# Patient Record
Sex: MALE | Race: BLACK OR AFRICAN AMERICAN | NOT HISPANIC OR LATINO | Employment: STUDENT | URBAN - METROPOLITAN AREA
[De-identification: names, ages, dates, MRNs, and addresses within clinical notes are randomized per-mention and may not be internally consistent; named-entity substitution may affect disease eponyms.]

---
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
## 2020-01-09 DIAGNOSIS — E10.65 UNCONTROLLED TYPE 1 DIABETES MELLITUS WITH HYPERGLYCEMIA: ICD-10-CM

## 2020-01-09 NOTE — TELEPHONE ENCOUNTER
Returned ivan's call requesting peds endo office submit a PA for 90 day supply of Humalog.  Dad informed PA will be submitted via [employer] and the turnaround time could take 24-48 hours for a response.  Ivan verbalized understanding.    ----- Message from Shannan Mcfadden sent at 1/9/2020  2:25 PM CST -----  Contact: Ivan [phone]  Would like to receive medical advice.    Would they like a call back or a response via [employer]:  Call back     Additional information: ivan is calling to speak with the nurse regarding medication    insulin lispro (HUMALOG U-100 INSULIN) 100 unit/mL injection needs a PA. Ivan is requesting a call back regarding PA.

## 2020-02-11 DIAGNOSIS — E10.65 UNCONTROLLED TYPE 1 DIABETES MELLITUS WITH HYPERGLYCEMIA: ICD-10-CM

## 2020-02-13 RX ORDER — INSULIN LISPRO 100 [IU]/ML
INJECTION, SOLUTION INTRAVENOUS; SUBCUTANEOUS
Qty: 90 ML | Refills: 1 | Status: SHIPPED | OUTPATIENT
Start: 2020-02-13 | End: 2021-01-04 | Stop reason: SDUPTHER

## 2020-03-19 ENCOUNTER — TELEPHONE (OUTPATIENT)
Dept: PEDIATRIC ENDOCRINOLOGY | Facility: CLINIC | Age: 20
End: 2020-03-19

## 2020-03-19 NOTE — TELEPHONE ENCOUNTER
Per Dr. Weaver, called pt to schedule [employer] Virtual Visit for  appt scheduled for Monday (3/23) at .  Pt verified he has an active [employer] account and an iOS or Android cell phone or tablet with a microphone and front-facing camera with wi-fi connection.  Informed to check in 15 min prior to video visit via [employer] to begin the video visit and if any issues to contact our office prior to video visit.  Pt stated  verbalized understanding.

## 2020-03-23 ENCOUNTER — OFFICE VISIT (OUTPATIENT)
Dept: PEDIATRIC ENDOCRINOLOGY | Facility: CLINIC | Age: 20
End: 2020-03-23
Payer: COMMERCIAL

## 2020-03-23 DIAGNOSIS — E10.65 TYPE 1 DIABETES MELLITUS WITH HYPERGLYCEMIA: Primary | ICD-10-CM

## 2020-03-23 PROCEDURE — 3044F HG A1C LEVEL LT 7.0%: CPT | Mod: CPTII,95,, | Performed by: PEDIATRICS

## 2020-03-23 PROCEDURE — 3066F NEPHROPATHY DOC TX: CPT | Mod: CPTII,95,, | Performed by: PEDIATRICS

## 2020-03-23 PROCEDURE — 99499 NO LOS: ICD-10-PCS | Mod: 95,,, | Performed by: PEDIATRICS

## 2020-03-23 PROCEDURE — 3044F PR MOST RECENT HEMOGLOBIN A1C LEVEL <7.0%: ICD-10-PCS | Mod: CPTII,95,, | Performed by: PEDIATRICS

## 2020-03-23 PROCEDURE — 3061F PR NEG MICROALBUMINURIA RESULT DOCUMENTED/REVIEW: ICD-10-PCS | Mod: CPTII,95,, | Performed by: PEDIATRICS

## 2020-03-23 PROCEDURE — 3061F NEG MICROALBUMINURIA REV: CPT | Mod: CPTII,95,, | Performed by: PEDIATRICS

## 2020-03-23 PROCEDURE — 99499 UNLISTED E&M SERVICE: CPT | Mod: 95,,, | Performed by: PEDIATRICS

## 2020-03-23 PROCEDURE — 3066F PR DOCUMENTATION OF TREATMENT FOR NEPHROPATHY: ICD-10-PCS | Mod: CPTII,95,, | Performed by: PEDIATRICS

## 2020-04-06 DIAGNOSIS — E10.65 UNCONTROLLED TYPE 1 DIABETES MELLITUS WITH HYPERGLYCEMIA: ICD-10-CM

## 2020-04-17 DIAGNOSIS — E10.65 UNCONTROLLED TYPE 1 DIABETES MELLITUS WITH HYPERGLYCEMIA: ICD-10-CM

## 2020-04-17 NOTE — TELEPHONE ENCOUNTER
Returned pt's call requesting rx to [facility] for Dexcom transmitter refill.  Pt informed refill request will be forwarded to provider' verbalized understanding.    ----- Message from Joy Aaron sent at 4/17/2020 12:23 PM CDT -----  Contact: The Pt [phone]  Type:  Needs Medical Advice    Who Called: The Pt  Symptoms (please be specific):   How long has patient had these symptoms:   Pharmacy name and phone #: [facility] - [place], [place] - [address] [phone] (Phone)  [phone] (Fax)  Would the patient rather a call back The Pt [phone]  Best Call Back Number  :Additional Information:  The requested a refill for transmitters but got sensors Please call the pt.

## 2020-05-12 ENCOUNTER — PATIENT MESSAGE (OUTPATIENT)
Dept: ADMINISTRATIVE | Facility: HOSPITAL | Age: 20
End: 2020-05-12

## 2020-07-15 ENCOUNTER — LAB VISIT (OUTPATIENT)
Dept: LAB | Facility: HOSPITAL | Age: 20
End: 2020-07-15
Attending: NURSE PRACTITIONER
Payer: COMMERCIAL

## 2020-07-15 ENCOUNTER — OFFICE VISIT (OUTPATIENT)
Dept: PEDIATRIC ENDOCRINOLOGY | Facility: CLINIC | Age: 20
End: 2020-07-15
Payer: COMMERCIAL

## 2020-07-15 VITALS
WEIGHT: 159.06 LBS | SYSTOLIC BLOOD PRESSURE: 118 MMHG | DIASTOLIC BLOOD PRESSURE: 69 MMHG | HEART RATE: 87 BPM | HEIGHT: 66 IN | BODY MASS INDEX: 25.56 KG/M2

## 2020-07-15 DIAGNOSIS — E10.65 UNCONTROLLED TYPE 1 DIABETES MELLITUS WITH HYPERGLYCEMIA: ICD-10-CM

## 2020-07-15 DIAGNOSIS — E10.9 CONTROLLED DIABETES MELLITUS TYPE 1 WITHOUT COMPLICATIONS: ICD-10-CM

## 2020-07-15 DIAGNOSIS — E10.649 HYPOGLYCEMIA DUE TO TYPE 1 DIABETES MELLITUS: ICD-10-CM

## 2020-07-15 DIAGNOSIS — E10.9 DIABETES MELLITUS TYPE 1, UNCOMPLICATED, ON LONG TERM INSULIN PUMP: Primary | ICD-10-CM

## 2020-07-15 DIAGNOSIS — Z96.41 DIABETES MELLITUS TYPE 1, UNCOMPLICATED, ON LONG TERM INSULIN PUMP: Primary | ICD-10-CM

## 2020-07-15 DIAGNOSIS — Z46.81 INSULIN PUMP TITRATION: ICD-10-CM

## 2020-07-15 DIAGNOSIS — Z96.41 INSULIN PUMP STATUS: ICD-10-CM

## 2020-07-15 LAB — TSH SERPL DL<=0.005 MIU/L-ACNC: 1.21 UIU/ML (ref 0.4–4)

## 2020-07-15 PROCEDURE — 84443 ASSAY THYROID STIM HORMONE: CPT

## 2020-07-15 PROCEDURE — 83036 HEMOGLOBIN GLYCOSYLATED A1C: CPT

## 2020-07-15 PROCEDURE — 95251 PR GLUCOSE MONITOR, 72 HOUR, PHYS INTERP: ICD-10-PCS | Mod: S$GLB,,, | Performed by: NURSE PRACTITIONER

## 2020-07-15 PROCEDURE — 99215 OFFICE O/P EST HI 40 MIN: CPT | Mod: S$GLB,,, | Performed by: NURSE PRACTITIONER

## 2020-07-15 PROCEDURE — 99215 PR OFFICE/OUTPT VISIT, EST, LEVL V, 40-54 MIN: ICD-10-PCS | Mod: S$GLB,,, | Performed by: NURSE PRACTITIONER

## 2020-07-15 PROCEDURE — 99999 PR PBB SHADOW E&M-EST. PATIENT-LVL IV: ICD-10-PCS | Mod: PBBFAC,,, | Performed by: NURSE PRACTITIONER

## 2020-07-15 PROCEDURE — 99999 PR PBB SHADOW E&M-EST. PATIENT-LVL IV: CPT | Mod: PBBFAC,,, | Performed by: NURSE PRACTITIONER

## 2020-07-15 PROCEDURE — 36415 COLL VENOUS BLD VENIPUNCTURE: CPT

## 2020-07-15 PROCEDURE — 95251 CONT GLUC MNTR ANALYSIS I&R: CPT | Mod: S$GLB,,, | Performed by: NURSE PRACTITIONER

## 2020-07-15 NOTE — PROGRESS NOTES
Cindy Garcia is a 19 y.o. male being seen in the pediatric endocrinology clinic today in follow up for type 1 diabetes. He was accompanied by his father.    He was diagnosed with type 1 diabetes in April 2006. He was last seen on 3/23/2020 by Dr. Weaver. This last visit was done virutally due to COVID-19.     Interval History:   He is on CSII using Tandem TSlim (transitioned from Omnipod in June 2019). He is also using a Dexcom CGM G6 and has new Control IQ program. No severe hypoglycemic events, DKA or other adverse events since last visit.     Review of blood sugars from pump download/logbook, shows: overall average blood glucose of 196 mg/dL (Range 184-208). Only 8% correction bolus.  Injection/infusion sites for his pump include: back, thighs. Infusion site for Dexcom is arms.CGM data: His average BG on his CGM is 142 mg/dL +/- 47. GMI 6.7%. He is in range 75% of the time, above range 20% of the time, below range 4 %, <1% very low. CGM wear 29/30 days.     Cindy FLORES is having few episodes of hypoglycemia. He feels shaky and hungry when his BG is low.  He denies symptoms of hyperglycemia such as blurry vision, excessive thirst, fatigue and polyuria.      Nutrition: carb counting but is not on a specified limit, giving insulin with start of meals    Review of growth chart shows: 7 lb weight gain    Insulin Instructions  Pump Settings   insulin lispro 100 unit/mL injection   Last edited by Allison Hubbard NP on 7/16/2020 at 3:41 PM      Basal Rate   Total Basal Dose: 24.6 units/day   Time units/hr   12:00 AM 0.95    3:00 AM 0.95    6:00 AM 1    9:00 PM 1.3      Blood Glucose Target   Time mg/dL   12:00  - 120      Sensitivity Factor   Time mg/dL/unit   12:00 AM 40      Carb Ratio   Time g/unit   12:00 AM 9    3:00 AM 7    9:00 PM 8     Total daily dose: 52 units/day, 43% basal    Review of Systems:  Constitutional: Negative for fever.   HENT: Negative for congestion and sore throat.    Eyes: Negative for  "discharge and redness.   Respiratory: Negative for cough and shortness of breath.    Cardiovascular: Negative for chest pain.   Gastrointestinal: Negative for nausea and vomiting, constipation, or diarrhea.    Musculoskeletal: Negative for myalgias or arthralgias.   Skin: Negative for rash.   Neurological: Negative for headaches.   Endocrine: see HPI and negative for - change in hair pattern or skin changes    Past Medical/Family/Surgical History:  I have reviewed, and verified the past medical, surgical, and family history and updated as appropriate. No changes.    Social History:  He will be starting sophomore year at [employer], studying animal science, wants to be a .    Meds:  Reviewed and reconciled.     Physical Exam:  /69   Pulse 87   Ht 5' 5.98" (1.676 m)   Wt 72.1 kg (159 lb 1 oz)   BMI 25.69 kg/m²    General: alert, active, in no acute distress  Skin: normal tone and texture, no rashes  Injection Sites: normal  Eyes:  Conjunctivae are normal  Neck:  supple, no lymphadenopathy, no thyromegaly  Lungs: Effort normal and breath sounds clear.   Heart:  regular rate and rhythm, no murmur, no edema  Abdomen:  Abdomen soft, non-tender. No hepatomegaly.  Neuro: gross motor exam normal by observation, denies tingling or burning in feet or hands  Foot exam: feet warm, no erythema, no skin breakdown or lesions, 2+ pedal pulses bilaterally    Labs:  Hemoglobin A1C   Date Value Ref Range Status   12/23/2019 6.4 (H) 4.0 - 5.6 % Final     Comment:     ADA Screening Guidelines:  5.7-6.4%  Consistent with prediabetes  >or=6.5%  Consistent with diabetes  High levels of fetal hemoglobin interfere with the HbA1C  assay. Heterozygous hemoglobin variants (HbS, HgC, etc)do  not significantly interfere with this assay.   However, presence of multiple variants may affect accuracy.     06/04/2019 7.2 (H) 4.0 - 5.6 % Final     Comment:     ADA Screening Guidelines:  5.7-6.4%  Consistent with prediabetes  >or=6.5%  " Consistent with diabetes  High levels of fetal hemoglobin interfere with the HbA1C  assay. Heterozygous hemoglobin variants (HbS, HgC, etc)do  not significantly interfere with this assay.   However, presence of multiple variants may affect accuracy.     03/04/2019 7.6 (H) 4.0 - 5.6 % Final     Comment:     ADA Screening Guidelines:  5.7-6.4%  Consistent with prediabetes  >or=6.5%  Consistent with diabetes  High levels of fetal hemoglobin interfere with the HbA1C  assay. Heterozygous hemoglobin variants (HbS, HgC, etc)do  not significantly interfere with this assay.   However, presence of multiple variants may affect accuracy.         Screening tests:   Component      Latest Ref Rng & Units 3/4/2019 3/23/2018   Cholesterol      120 - 199 mg/dL  158   Triglycerides      30 - 150 mg/dL  43   HDL      40 - 75 mg/dL  59   LDL Cholesterol External      63.0 - 159.0 mg/dL  90.4   Hdl/Cholesterol Ratio      20.0 - 50.0 %  37.3   Total Cholesterol/HDL Ratio      2.0 - 5.0  2.7   Non-HDL Cholesterol      mg/dL  99   Microalbum.,U,Random      ug/mL 7.0    Creatinine, Random Ur      23.0 - 375.0 mg/dL 259.0    MICROALB/CREAT RATIO      0.0 - 30.0 ug/mg 2.7    TSH      0.400 - 4.000 uIU/mL 1.181    IgA      40 - 350 mg/dL 126    TTG IgA      <20 UNITS 4        Eye Exam: 7/14/2020 - Dr. Iftikhar Carreon - no diabetic retinopathy    Assessment/Plan:  Cindy FLORES is a 19 y.o. male with T1D of ~14 years 3 months duration on ~0.72 units/kg/day.      Lab Results   Component Value Date    HGBA1C 6.3 (H) 07/15/2020     Generally doing well with diabetes under good control. A1c is within target range for age.    His blood sugars, pump settings, and bolus doses were reviewed for the past four weeks. His CGM data was reviewed for the past month. The following changes were made to his insulin regimen: increased basal insulin rate during the day, adjusted sleep schedule times on pump to coincide with his sleep patterns during the  summer.    Education: blood sugar goals, complications of diabetes mellitus, hypoglycemia prevention and treatment, exercise, site rotation and insulin adjustments, and causes and consequences of prolonged elevations in blood glucose and A1C, sick day management, causes, recognition and consequences of DKA, school issues, and goals for therapy.    Screening tests due:  Lipid panel screening - recommended in 3 years if normal, LDL goal <100: Due 3/23/2021  Thyroid screening annually - due now, overdue  Celiac screen - baseline and 2 yrs after diagnosis: Due if symptoms.  Eye Exam: Biennially 5 years after diagnosis: Due 7/2022  Comprehensive Foot Exam: Annually after 5 years DM: Due 7/2021, unable to do complete neuro assessment due to lack of monofilament and tuning fork  Microablumin/creatinine ratio: Annually 5 yrs after diagnosis, Due now, overdue    Component      Latest Ref Rng & Units 7/15/2020   Microalbum.,U,Random      ug/mL 4.0   Creatinine, Random Ur      23.0 - 375.0 mg/dL 229.0   MICROALB/CREAT RATIO      0.0 - 30.0 ug/mg 1.7   TSH      0.400 - 4.000 uIU/mL 1.206     It was a pleasure to see your patient in clinic today. Please call with any questions or concerns.      HAZEL Gtz  Pediatric Endocrinology    Over 50% of this 50 minute visit was spent in counseling/coordinating care. I counseled the family on the education topics listed above.

## 2020-07-15 NOTE — PATIENT INSTRUCTIONS
Insulin Instructions  Pump Settings   insulin lispro 100 unit/mL injection   Last edited by Allison Hubbard NP on 7/15/2020 at 8:58 AM      Basal Rate   Total Basal Dose: 26.1 units/day   Time units/hr   12:00 AM 0.95    3:00 AM 0.95    6:00 AM 1.1    9:00 PM 1.3      Blood Glucose Target   Time mg/dL   12:00  - 120      Sensitivity Factor   Time mg/dL/unit   12:00 AM 40      Carb Ratio   Time g/unit   12:00 AM 9    3:00 AM 7    9:00 PM 8

## 2020-07-16 LAB
ESTIMATED AVG GLUCOSE: 134 MG/DL (ref 68–131)
HBA1C MFR BLD HPLC: 6.3 % (ref 4–5.6)

## 2020-07-24 DIAGNOSIS — E10.65 UNCONTROLLED TYPE 1 DIABETES MELLITUS WITH HYPERGLYCEMIA: ICD-10-CM

## 2020-07-24 RX ORDER — BLOOD-GLUCOSE TRANSMITTER
EACH MISCELLANEOUS
Qty: 2 DEVICE | Refills: 4 | Status: SHIPPED | OUTPATIENT
Start: 2020-07-24 | End: 2020-09-27 | Stop reason: SDUPTHER

## 2020-08-14 DIAGNOSIS — Z11.59 NEED FOR HEPATITIS C SCREENING TEST: ICD-10-CM

## 2020-10-05 ENCOUNTER — PATIENT MESSAGE (OUTPATIENT)
Dept: ADMINISTRATIVE | Facility: HOSPITAL | Age: 20
End: 2020-10-05

## 2020-10-15 ENCOUNTER — OFFICE VISIT (OUTPATIENT)
Dept: PEDIATRIC ENDOCRINOLOGY | Facility: CLINIC | Age: 20
End: 2020-10-15
Payer: COMMERCIAL

## 2020-10-15 DIAGNOSIS — Z96.41 DIABETES MELLITUS TYPE 1, UNCOMPLICATED, ON LONG TERM INSULIN PUMP: Primary | ICD-10-CM

## 2020-10-15 DIAGNOSIS — Z96.41 INSULIN PUMP STATUS: ICD-10-CM

## 2020-10-15 DIAGNOSIS — E10.9 DIABETES MELLITUS TYPE 1, UNCOMPLICATED, ON LONG TERM INSULIN PUMP: Primary | ICD-10-CM

## 2020-10-15 DIAGNOSIS — E10.649 HYPOGLYCEMIA DUE TO TYPE 1 DIABETES MELLITUS: ICD-10-CM

## 2020-10-15 PROCEDURE — 99214 OFFICE O/P EST MOD 30 MIN: CPT | Mod: 95,,, | Performed by: NURSE PRACTITIONER

## 2020-10-15 PROCEDURE — 99214 PR OFFICE/OUTPT VISIT, EST, LEVL IV, 30-39 MIN: ICD-10-PCS | Mod: 95,,, | Performed by: NURSE PRACTITIONER

## 2020-10-15 NOTE — PROGRESS NOTES
The patient location is: [facility]  The chief complaint leading to consultation is: type 1 diabetes mellitus follow up    Visit type: audiovisual    Face to Face time with patient: 21 minutes  45 minutes of total time spent on the encounter, which includes face to face time and non-face to face time preparing to see the patient (eg, review of tests), Obtaining and/or reviewing separately obtained history, Documenting clinical information in the electronic or other health record, Independently interpreting results (not separately reported) and communicating results to the patient/family/caregiver, or Care coordination (not separately reported).     Each patient to whom he or she provides medical services by telemedicine is:  (1) informed of the relationship between the physician and patient and the respective role of any other health care provider with respect to management of the patient; and (2) notified that he or she may decline to receive medical services by telemedicine and may withdraw from such care at any time.    Notes:   Cindy Garcia is a 19 y.o. male being seen in the pediatric endocrinology clinic today in follow up for type 1 diabetes.     He was diagnosed with type 1 diabetes in April 2006. He was last seen in July 2020.      Interval History:   He is on CSII using Tandem TSlim (transitioned from Omnipod in June 2019). He is also using a Dexcom CGM G6 and Control IQ program. No DKA or other adverse events since last visit. He had 1 hypoglycemic event which occurred during the night.    Review of blood sugars from CGM download shows: overall average blood glucose of 149 mg/dL +/- 53. He is in target range 72% of the time, above target 25% of the time. 5% correction bolus.GMI 6.9%. He is in range 75% of the time, above range 20% of the time, below range 4 %, <1% very low. CGM wear 29/30 days.   Injection/infusion sites for his pump include: back, thighs.       Cindy FLORES is having rare episodes of hypoglycemia.  He feels shaky and hungry when his BG is low. BG in the 50s this past Tuesday night. He had to get up and treat his low. Unsure of cause of low but was following an extended bolus for his dinner.  He denies symptoms of hyperglycemia such as blurry vision, excessive thirst, fatigue and polyuria.      Nutrition: carb counting but is not on a specified limit, giving insulin with start of meals, occasionally after he starts eating.  Cindy reports eating a lot of frozen food and generally unhealthy. Not eating in the cafeteria at school much.  Exercise: not getting any regular physical activity    Insulin Instructions  Pump Settings   insulin lispro 100 unit/mL injection   Last edited by Allison Hubbard NP on 7/16/2020 at 3:41 PM      Basal Rate   Total Basal Dose: 24.6 units/day   Time units/hr   12:00 AM 0.95    3:00 AM 0.95    6:00 AM 1    9:00 PM 1.3      Blood Glucose Target   Time mg/dL   12:00  - 120      Sensitivity Factor   Time mg/dL/unit   12:00 AM 40      Carb Ratio   Time g/unit   12:00 AM 9    3:00 AM 7    9:00 PM 8     Total daily dose: 63 units/day, 40% basal    Review of Systems:  Constitutional: Negative for fever.   HENT: Negative for congestion and sore throat.    Eyes: Negative for discharge and redness. No acute changes.  Respiratory: Negative for cough and shortness of breath.    Cardiovascular: Negative for chest pain.   Gastrointestinal: Negative for nausea and vomiting, constipation, or diarrhea.    Musculoskeletal: Negative for myalgias or arthralgias.   Skin: Negative for rash.   Neurological: Negative for headaches.   Endocrine: see HPI and negative for - change in hair pattern or skin changes    Past Medical/Family/Surgical History:  I have reviewed, and verified the past medical, surgical, and family history and updated as appropriate. No changes.    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  Classes are hybrid, some in  person and some virtually.    Meds:  Reviewed and reconciled.     Physical Exam:  There were no vitals taken for this visit.   General: alert, active, in no acute distress, well appearing on video  Injection Sites: normal per patient assessment    Labs:  Hemoglobin A1C   Date Value Ref Range Status   07/15/2020 6.3 (H) 4.0 - 5.6 % Final     Comment:     ADA Screening Guidelines:  5.7-6.4%  Consistent with prediabetes  >or=6.5%  Consistent with diabetes  High levels of fetal hemoglobin interfere with the HbA1C  assay. Heterozygous hemoglobin variants (HbS, HgC, etc)do  not significantly interfere with this assay.   However, presence of multiple variants may affect accuracy.     12/23/2019 6.4 (H) 4.0 - 5.6 % Final     Comment:     ADA Screening Guidelines:  5.7-6.4%  Consistent with prediabetes  >or=6.5%  Consistent with diabetes  High levels of fetal hemoglobin interfere with the HbA1C  assay. Heterozygous hemoglobin variants (HbS, HgC, etc)do  not significantly interfere with this assay.   However, presence of multiple variants may affect accuracy.     06/04/2019 7.2 (H) 4.0 - 5.6 % Final     Comment:     ADA Screening Guidelines:  5.7-6.4%  Consistent with prediabetes  >or=6.5%  Consistent with diabetes  High levels of fetal hemoglobin interfere with the HbA1C  assay. Heterozygous hemoglobin variants (HbS, HgC, etc)do  not significantly interfere with this assay.   However, presence of multiple variants may affect accuracy.         Screening tests:   Component      Latest Ref Rng & Units 7/15/2020 3/4/2019 3/23/2018   Cholesterol      120 - 199 mg/dL   158   Triglycerides      30 - 150 mg/dL   43   HDL      40 - 75 mg/dL   59   LDL Cholesterol External      63.0 - 159.0 mg/dL   90.4   Hdl/Cholesterol Ratio      20.0 - 50.0 %   37.3   Total Cholesterol/HDL Ratio      2.0 - 5.0   2.7   Non-HDL Cholesterol      mg/dL   99   Microalbum.,U,Random      ug/mL 4.0     Creatinine, Random Ur      23.0 - 375.0 mg/dL 229.0      MICROALB/CREAT RATIO      0.0 - 30.0 ug/mg 1.7     IgA      40 - 350 mg/dL  126    TTG IgA      <20 UNITS  4    TSH      0.400 - 4.000 uIU/mL 1.206         Eye Exam: 7/14/2020 - Dr. Iftikhar Carreon - no diabetic retinopathy    Assessment/Plan:  Cindy FLORES is a 19 y.o. male with T1D of ~14 years 6 months duration on ~0.87 units/kg/day.      Lab Results   Component Value Date    HGBA1C 6.3 (H) 07/15/2020       His last A1C in July was 6.3% which is consistent with well controlled diabetes. Based upon his pump and CGM data his predicted A1C is ~6.9%. His blood sugars, pump settings, and bolus doses were reviewed for the past four weeks. His CGM data was reviewed for the past month. The following changes were made to his insulin regimen: no changes to his insulin settings at this visit. Cindy is doing well on the Control IQ program. There is very little correction insulin doses being given and rare hypoglycemia. The recent episode may be due to using the extended bolus feature or miscalculating carbs. He will try to be consistent with timing of meal bolus as well.    Education: interpretation of lab results, hypoglycemia prevention and treatment, exercise and nutrition, causes, recognition and consequences of DKA, impact of physical activity on blood glucose control, insulin kinetics, and goals for therapy.    Screening tests due:  Lipid panel screening - recommended in 3 years if normal, LDL goal <100: Due 3/23/2021  Thyroid screening annually - due 7/15/2021  Celiac screen - baseline and 2 yrs after diagnosis: Due if symptoms.  Eye Exam: Biennially 5 years after diagnosis: Due 7/2022  Comprehensive Foot Exam: Annually after 5 years DM: Due 7/2021  Microablumin/creatinine ratio: Annually 5 yrs after diagnosis, Due 7/15/2021    It was a pleasure to see your patient in clinic today. Please call with any questions or concerns.      HAZEL Gtz  Pediatric Endocrinology    Over 50% of this 21 minute visit was spent  in counseling/coordinating care. I counseled the family on the education topics listed above.

## 2020-10-15 NOTE — PATIENT INSTRUCTIONS
Insulin Instructions  Pump Settings   insulin lispro 100 unit/mL injection   Last edited by Allison Hubbard NP on 7/16/2020 at 3:41 PM      Basal Rate   Total Basal Dose: 24.6 units/day   Time units/hr   12:00 AM 0.95    3:00 AM 0.95    6:00 AM 1    9:00 PM 1.3      Blood Glucose Target   Time mg/dL   12:00  - 120      Sensitivity Factor   Time mg/dL/unit   12:00 AM 40      Carb Ratio   Time g/unit   12:00 AM 9    3:00 AM 7    9:00 PM 8

## 2020-12-30 ENCOUNTER — PATIENT MESSAGE (OUTPATIENT)
Dept: PEDIATRIC ENDOCRINOLOGY | Facility: CLINIC | Age: 20
End: 2020-12-30

## 2021-01-04 ENCOUNTER — OFFICE VISIT (OUTPATIENT)
Dept: PEDIATRIC ENDOCRINOLOGY | Facility: CLINIC | Age: 21
End: 2021-01-04
Payer: COMMERCIAL

## 2021-01-04 DIAGNOSIS — Z46.81 INSULIN PUMP TITRATION: ICD-10-CM

## 2021-01-04 DIAGNOSIS — Z96.41 INSULIN PUMP STATUS: ICD-10-CM

## 2021-01-04 DIAGNOSIS — E10.65 TYPE 1 DIABETES MELLITUS WITH HYPERGLYCEMIA: Primary | ICD-10-CM

## 2021-01-04 PROCEDURE — 99214 PR OFFICE/OUTPT VISIT, EST, LEVL IV, 30-39 MIN: ICD-10-PCS | Mod: 95,,, | Performed by: PEDIATRICS

## 2021-01-04 PROCEDURE — 99214 OFFICE O/P EST MOD 30 MIN: CPT | Mod: 95,,, | Performed by: PEDIATRICS

## 2021-01-04 PROCEDURE — 3044F PR MOST RECENT HEMOGLOBIN A1C LEVEL <7.0%: ICD-10-PCS | Mod: CPTII,,, | Performed by: PEDIATRICS

## 2021-01-04 PROCEDURE — 3044F HG A1C LEVEL LT 7.0%: CPT | Mod: CPTII,,, | Performed by: PEDIATRICS

## 2021-01-04 RX ORDER — INSULIN LISPRO 100 [IU]/ML
INJECTION, SOLUTION INTRAVENOUS; SUBCUTANEOUS
Qty: 90 ML | Refills: 1 | Status: SHIPPED | OUTPATIENT
Start: 2021-01-04 | End: 2021-03-26 | Stop reason: SDUPTHER

## 2021-01-04 RX ORDER — BLOOD-GLUCOSE SENSOR
EACH MISCELLANEOUS
Qty: 9 EACH | Refills: 12 | Status: SHIPPED | OUTPATIENT
Start: 2021-01-04 | End: 2021-04-20 | Stop reason: SDUPTHER

## 2021-01-05 ENCOUNTER — LAB VISIT (OUTPATIENT)
Dept: LAB | Facility: HOSPITAL | Age: 21
End: 2021-01-05
Attending: PEDIATRICS
Payer: COMMERCIAL

## 2021-01-05 ENCOUNTER — PATIENT MESSAGE (OUTPATIENT)
Dept: ADMINISTRATIVE | Facility: HOSPITAL | Age: 21
End: 2021-01-05

## 2021-01-05 DIAGNOSIS — E87.0 TYPE I DIABETES MELLITUS WITH HYPEROSMOLAR COMA: Primary | ICD-10-CM

## 2021-01-05 DIAGNOSIS — E10.69 TYPE I DIABETES MELLITUS WITH HYPEROSMOLAR COMA: Primary | ICD-10-CM

## 2021-01-05 DIAGNOSIS — E10.65 TYPE I DIABETES MELLITUS WITH HYPEROSMOLAR COMA: Primary | ICD-10-CM

## 2021-01-05 LAB
ESTIMATED AVG GLUCOSE: 143 MG/DL (ref 68–131)
HBA1C MFR BLD HPLC: 6.6 % (ref 4–5.6)

## 2021-01-05 PROCEDURE — 36415 COLL VENOUS BLD VENIPUNCTURE: CPT

## 2021-01-05 PROCEDURE — 83036 HEMOGLOBIN GLYCOSYLATED A1C: CPT

## 2021-01-08 ENCOUNTER — TELEPHONE (OUTPATIENT)
Dept: PEDIATRIC ENDOCRINOLOGY | Facility: CLINIC | Age: 21
End: 2021-01-08

## 2021-01-13 ENCOUNTER — PATIENT MESSAGE (OUTPATIENT)
Dept: PEDIATRIC ENDOCRINOLOGY | Facility: CLINIC | Age: 21
End: 2021-01-13

## 2021-01-20 ENCOUNTER — TELEPHONE (OUTPATIENT)
Dept: PEDIATRIC ENDOCRINOLOGY | Facility: CLINIC | Age: 21
End: 2021-01-20

## 2021-03-13 ENCOUNTER — IMMUNIZATION (OUTPATIENT)
Dept: INTERNAL MEDICINE | Facility: CLINIC | Age: 21
End: 2021-03-13
Payer: COMMERCIAL

## 2021-03-13 DIAGNOSIS — Z23 NEED FOR VACCINATION: Primary | ICD-10-CM

## 2021-03-13 PROCEDURE — 91300 COVID-19, MRNA, LNP-S, PF, 30 MCG/0.3 ML DOSE VACCINE: CPT | Mod: PBBFAC | Performed by: FAMILY MEDICINE

## 2021-03-23 DIAGNOSIS — E10.65 TYPE 1 DIABETES MELLITUS WITH HYPERGLYCEMIA: ICD-10-CM

## 2021-03-23 RX ORDER — INSULIN LISPRO 100 [IU]/ML
INJECTION, SOLUTION INTRAVENOUS; SUBCUTANEOUS
Qty: 90 ML | Refills: 1 | Status: CANCELLED | OUTPATIENT
Start: 2021-03-23

## 2021-03-26 ENCOUNTER — TELEPHONE (OUTPATIENT)
Dept: ENDOCRINOLOGY | Facility: CLINIC | Age: 21
End: 2021-03-26

## 2021-03-26 DIAGNOSIS — E10.65 TYPE 1 DIABETES MELLITUS WITH HYPERGLYCEMIA: ICD-10-CM

## 2021-03-26 RX ORDER — INSULIN LISPRO 100 [IU]/ML
INJECTION, SOLUTION INTRAVENOUS; SUBCUTANEOUS
Qty: 90 ML | Refills: 1 | Status: SHIPPED | OUTPATIENT
Start: 2021-03-26 | End: 2021-09-20 | Stop reason: SDUPTHER

## 2021-04-03 ENCOUNTER — IMMUNIZATION (OUTPATIENT)
Dept: INTERNAL MEDICINE | Facility: CLINIC | Age: 21
End: 2021-04-03
Payer: COMMERCIAL

## 2021-04-03 DIAGNOSIS — Z23 NEED FOR VACCINATION: Primary | ICD-10-CM

## 2021-04-03 PROCEDURE — 0002A COVID-19, MRNA, LNP-S, PF, 30 MCG/0.3 ML DOSE VACCINE: CPT | Mod: PBBFAC | Performed by: FAMILY MEDICINE

## 2021-04-03 PROCEDURE — 91300 COVID-19, MRNA, LNP-S, PF, 30 MCG/0.3 ML DOSE VACCINE: CPT | Mod: PBBFAC | Performed by: FAMILY MEDICINE

## 2021-04-06 ENCOUNTER — PATIENT MESSAGE (OUTPATIENT)
Dept: ADMINISTRATIVE | Facility: HOSPITAL | Age: 21
End: 2021-04-06

## 2021-04-20 DIAGNOSIS — E10.65 TYPE 1 DIABETES MELLITUS WITH HYPERGLYCEMIA: ICD-10-CM

## 2021-04-22 ENCOUNTER — TELEPHONE (OUTPATIENT)
Dept: PEDIATRIC ENDOCRINOLOGY | Facility: CLINIC | Age: 21
End: 2021-04-22

## 2021-04-22 RX ORDER — BLOOD-GLUCOSE SENSOR
EACH MISCELLANEOUS
Qty: 9 EACH | Refills: 12 | Status: SHIPPED | OUTPATIENT
Start: 2021-04-22 | End: 2022-07-25 | Stop reason: SDUPTHER

## 2021-06-15 DIAGNOSIS — E10.65 UNCONTROLLED TYPE 1 DIABETES MELLITUS WITH HYPERGLYCEMIA: ICD-10-CM

## 2021-06-15 RX ORDER — BLOOD-GLUCOSE TRANSMITTER
EACH MISCELLANEOUS
Qty: 2 DEVICE | Refills: 4 | Status: CANCELLED | OUTPATIENT
Start: 2021-06-15

## 2021-06-18 ENCOUNTER — TELEPHONE (OUTPATIENT)
Dept: PEDIATRIC ENDOCRINOLOGY | Facility: CLINIC | Age: 21
End: 2021-06-18

## 2021-06-21 ENCOUNTER — LAB VISIT (OUTPATIENT)
Dept: LAB | Facility: HOSPITAL | Age: 21
End: 2021-06-21
Attending: FAMILY MEDICINE
Payer: COMMERCIAL

## 2021-06-21 ENCOUNTER — OFFICE VISIT (OUTPATIENT)
Dept: PEDIATRIC ENDOCRINOLOGY | Facility: CLINIC | Age: 21
End: 2021-06-21
Payer: COMMERCIAL

## 2021-06-21 VITALS
BODY MASS INDEX: 27.81 KG/M2 | HEART RATE: 86 BPM | RESPIRATION RATE: 14 BRPM | SYSTOLIC BLOOD PRESSURE: 123 MMHG | DIASTOLIC BLOOD PRESSURE: 78 MMHG | HEIGHT: 66 IN | WEIGHT: 173.06 LBS

## 2021-06-21 DIAGNOSIS — E10.649 HYPOGLYCEMIA DUE TO TYPE 1 DIABETES MELLITUS: ICD-10-CM

## 2021-06-21 DIAGNOSIS — E10.65 TYPE 1 DIABETES MELLITUS WITH HYPERGLYCEMIA: ICD-10-CM

## 2021-06-21 DIAGNOSIS — Z11.59 NEED FOR HEPATITIS C SCREENING TEST: ICD-10-CM

## 2021-06-21 DIAGNOSIS — Z96.41 INSULIN PUMP STATUS: ICD-10-CM

## 2021-06-21 DIAGNOSIS — E10.9 CONTROLLED DIABETES MELLITUS TYPE 1 WITHOUT COMPLICATIONS: Primary | ICD-10-CM

## 2021-06-21 DIAGNOSIS — Z46.81 INSULIN PUMP TITRATION: ICD-10-CM

## 2021-06-21 LAB
CHOLEST SERPL-MCNC: 169 MG/DL (ref 120–199)
CHOLEST/HDLC SERPL: 3.3 {RATIO} (ref 2–5)
HDLC SERPL-MCNC: 51 MG/DL (ref 40–75)
HDLC SERPL: 30.2 % (ref 20–50)
LDLC SERPL CALC-MCNC: 98.6 MG/DL (ref 63–159)
NONHDLC SERPL-MCNC: 118 MG/DL
TRIGL SERPL-MCNC: 97 MG/DL (ref 30–150)
TSH SERPL DL<=0.005 MIU/L-ACNC: 1.24 UIU/ML (ref 0.4–4)

## 2021-06-21 PROCEDURE — 99215 OFFICE O/P EST HI 40 MIN: CPT | Mod: S$GLB,,, | Performed by: NURSE PRACTITIONER

## 2021-06-21 PROCEDURE — 80061 LIPID PANEL: CPT | Performed by: NURSE PRACTITIONER

## 2021-06-21 PROCEDURE — 95251 CONT GLUC MNTR ANALYSIS I&R: CPT | Mod: S$GLB,,, | Performed by: NURSE PRACTITIONER

## 2021-06-21 PROCEDURE — 3008F BODY MASS INDEX DOCD: CPT | Mod: CPTII,S$GLB,, | Performed by: NURSE PRACTITIONER

## 2021-06-21 PROCEDURE — 1126F AMNT PAIN NOTED NONE PRSNT: CPT | Mod: S$GLB,,, | Performed by: NURSE PRACTITIONER

## 2021-06-21 PROCEDURE — 83036 HEMOGLOBIN GLYCOSYLATED A1C: CPT | Performed by: NURSE PRACTITIONER

## 2021-06-21 PROCEDURE — 3044F HG A1C LEVEL LT 7.0%: CPT | Mod: CPTII,S$GLB,, | Performed by: NURSE PRACTITIONER

## 2021-06-21 PROCEDURE — 99215 PR OFFICE/OUTPT VISIT, EST, LEVL V, 40-54 MIN: ICD-10-PCS | Mod: S$GLB,,, | Performed by: NURSE PRACTITIONER

## 2021-06-21 PROCEDURE — 86803 HEPATITIS C AB TEST: CPT | Performed by: FAMILY MEDICINE

## 2021-06-21 PROCEDURE — 95251 PR GLUCOSE MONITOR, 72 HOUR, PHYS INTERP: ICD-10-PCS | Mod: S$GLB,,, | Performed by: NURSE PRACTITIONER

## 2021-06-21 PROCEDURE — 84443 ASSAY THYROID STIM HORMONE: CPT | Performed by: NURSE PRACTITIONER

## 2021-06-21 PROCEDURE — 99999 PR PBB SHADOW E&M-EST. PATIENT-LVL IV: CPT | Mod: PBBFAC,,, | Performed by: NURSE PRACTITIONER

## 2021-06-21 PROCEDURE — 3008F PR BODY MASS INDEX (BMI) DOCUMENTED: ICD-10-PCS | Mod: CPTII,S$GLB,, | Performed by: NURSE PRACTITIONER

## 2021-06-21 PROCEDURE — 36415 COLL VENOUS BLD VENIPUNCTURE: CPT | Performed by: NURSE PRACTITIONER

## 2021-06-21 PROCEDURE — 1126F PR PAIN SEVERITY QUANTIFIED, NO PAIN PRESENT: ICD-10-PCS | Mod: S$GLB,,, | Performed by: NURSE PRACTITIONER

## 2021-06-21 PROCEDURE — 99999 PR PBB SHADOW E&M-EST. PATIENT-LVL IV: ICD-10-PCS | Mod: PBBFAC,,, | Performed by: NURSE PRACTITIONER

## 2021-06-21 PROCEDURE — 3044F PR MOST RECENT HEMOGLOBIN A1C LEVEL <7.0%: ICD-10-PCS | Mod: CPTII,S$GLB,, | Performed by: NURSE PRACTITIONER

## 2021-06-22 DIAGNOSIS — E10.65 UNCONTROLLED TYPE 1 DIABETES MELLITUS WITH HYPERGLYCEMIA: ICD-10-CM

## 2021-06-22 LAB
ESTIMATED AVG GLUCOSE: 148 MG/DL (ref 68–131)
HBA1C MFR BLD: 6.8 % (ref 4–5.6)
HCV AB SERPL QL IA: NEGATIVE

## 2021-06-22 RX ORDER — BLOOD-GLUCOSE TRANSMITTER
EACH MISCELLANEOUS
Qty: 2 DEVICE | Refills: 4 | Status: SHIPPED | OUTPATIENT
Start: 2021-06-22 | End: 2022-07-25 | Stop reason: SDUPTHER

## 2021-07-07 ENCOUNTER — PATIENT MESSAGE (OUTPATIENT)
Dept: ADMINISTRATIVE | Facility: HOSPITAL | Age: 21
End: 2021-07-07

## 2021-08-19 ENCOUNTER — TELEPHONE (OUTPATIENT)
Dept: ENDOCRINOLOGY | Facility: CLINIC | Age: 21
End: 2021-08-19

## 2021-08-19 ENCOUNTER — PATIENT MESSAGE (OUTPATIENT)
Dept: PEDIATRIC ENDOCRINOLOGY | Facility: CLINIC | Age: 21
End: 2021-08-19

## 2021-08-19 DIAGNOSIS — E10.65 TYPE 1 DIABETES MELLITUS WITH HYPERGLYCEMIA: Primary | ICD-10-CM

## 2021-08-19 DIAGNOSIS — Z96.41 INSULIN PUMP STATUS: ICD-10-CM

## 2021-10-04 ENCOUNTER — PATIENT MESSAGE (OUTPATIENT)
Dept: ADMINISTRATIVE | Facility: HOSPITAL | Age: 21
End: 2021-10-04

## 2021-10-21 ENCOUNTER — OFFICE VISIT (OUTPATIENT)
Dept: PEDIATRIC ENDOCRINOLOGY | Facility: CLINIC | Age: 21
End: 2021-10-21
Payer: COMMERCIAL

## 2021-10-21 ENCOUNTER — LAB VISIT (OUTPATIENT)
Dept: LAB | Facility: HOSPITAL | Age: 21
End: 2021-10-21
Attending: PEDIATRICS
Payer: COMMERCIAL

## 2021-10-21 VITALS
SYSTOLIC BLOOD PRESSURE: 129 MMHG | DIASTOLIC BLOOD PRESSURE: 78 MMHG | WEIGHT: 175.13 LBS | HEART RATE: 82 BPM | BODY MASS INDEX: 28.15 KG/M2 | HEIGHT: 66 IN

## 2021-10-21 DIAGNOSIS — E10.65 TYPE 1 DIABETES MELLITUS WITH HYPERGLYCEMIA: Primary | ICD-10-CM

## 2021-10-21 DIAGNOSIS — E10.65 TYPE 1 DIABETES MELLITUS WITH HYPERGLYCEMIA: ICD-10-CM

## 2021-10-21 LAB
ESTIMATED AVG GLUCOSE: 131 MG/DL (ref 68–131)
HBA1C MFR BLD: 6.2 % (ref 4–5.6)

## 2021-10-21 PROCEDURE — 1159F MED LIST DOCD IN RCRD: CPT | Mod: CPTII,S$GLB,, | Performed by: PEDIATRICS

## 2021-10-21 PROCEDURE — 3008F PR BODY MASS INDEX (BMI) DOCUMENTED: ICD-10-PCS | Mod: CPTII,S$GLB,, | Performed by: PEDIATRICS

## 2021-10-21 PROCEDURE — 3061F NEG MICROALBUMINURIA REV: CPT | Mod: CPTII,S$GLB,, | Performed by: PEDIATRICS

## 2021-10-21 PROCEDURE — 3061F PR NEG MICROALBUMINURIA RESULT DOCUMENTED/REVIEW: ICD-10-PCS | Mod: CPTII,S$GLB,, | Performed by: PEDIATRICS

## 2021-10-21 PROCEDURE — 95251 PR GLUCOSE MONITOR, 72 HOUR, PHYS INTERP: ICD-10-PCS | Mod: S$GLB,,, | Performed by: PEDIATRICS

## 2021-10-21 PROCEDURE — 99999 PR PBB SHADOW E&M-EST. PATIENT-LVL III: CPT | Mod: PBBFAC,,, | Performed by: PEDIATRICS

## 2021-10-21 PROCEDURE — 3008F BODY MASS INDEX DOCD: CPT | Mod: CPTII,S$GLB,, | Performed by: PEDIATRICS

## 2021-10-21 PROCEDURE — 99215 PR OFFICE/OUTPT VISIT, EST, LEVL V, 40-54 MIN: ICD-10-PCS | Mod: S$GLB,,, | Performed by: PEDIATRICS

## 2021-10-21 PROCEDURE — 3078F DIAST BP <80 MM HG: CPT | Mod: CPTII,S$GLB,, | Performed by: PEDIATRICS

## 2021-10-21 PROCEDURE — 1160F PR REVIEW ALL MEDS BY PRESCRIBER/CLIN PHARMACIST DOCUMENTED: ICD-10-PCS | Mod: CPTII,S$GLB,, | Performed by: PEDIATRICS

## 2021-10-21 PROCEDURE — 3044F PR MOST RECENT HEMOGLOBIN A1C LEVEL <7.0%: ICD-10-PCS | Mod: CPTII,S$GLB,, | Performed by: PEDIATRICS

## 2021-10-21 PROCEDURE — 1160F RVW MEDS BY RX/DR IN RCRD: CPT | Mod: CPTII,S$GLB,, | Performed by: PEDIATRICS

## 2021-10-21 PROCEDURE — 36415 COLL VENOUS BLD VENIPUNCTURE: CPT | Performed by: PEDIATRICS

## 2021-10-21 PROCEDURE — 83036 HEMOGLOBIN GLYCOSYLATED A1C: CPT | Performed by: PEDIATRICS

## 2021-10-21 PROCEDURE — 3044F HG A1C LEVEL LT 7.0%: CPT | Mod: CPTII,S$GLB,, | Performed by: PEDIATRICS

## 2021-10-21 PROCEDURE — 99215 OFFICE O/P EST HI 40 MIN: CPT | Mod: S$GLB,,, | Performed by: PEDIATRICS

## 2021-10-21 PROCEDURE — 1159F PR MEDICATION LIST DOCUMENTED IN MEDICAL RECORD: ICD-10-PCS | Mod: CPTII,S$GLB,, | Performed by: PEDIATRICS

## 2021-10-21 PROCEDURE — 95251 CONT GLUC MNTR ANALYSIS I&R: CPT | Mod: S$GLB,,, | Performed by: PEDIATRICS

## 2021-10-21 PROCEDURE — 3074F PR MOST RECENT SYSTOLIC BLOOD PRESSURE < 130 MM HG: ICD-10-PCS | Mod: CPTII,S$GLB,, | Performed by: PEDIATRICS

## 2021-10-21 PROCEDURE — 3078F PR MOST RECENT DIASTOLIC BLOOD PRESSURE < 80 MM HG: ICD-10-PCS | Mod: CPTII,S$GLB,, | Performed by: PEDIATRICS

## 2021-10-21 PROCEDURE — 3066F PR DOCUMENTATION OF TREATMENT FOR NEPHROPATHY: ICD-10-PCS | Mod: CPTII,S$GLB,, | Performed by: PEDIATRICS

## 2021-10-21 PROCEDURE — 99999 PR PBB SHADOW E&M-EST. PATIENT-LVL III: ICD-10-PCS | Mod: PBBFAC,,, | Performed by: PEDIATRICS

## 2021-10-21 PROCEDURE — 3066F NEPHROPATHY DOC TX: CPT | Mod: CPTII,S$GLB,, | Performed by: PEDIATRICS

## 2021-10-21 PROCEDURE — 3074F SYST BP LT 130 MM HG: CPT | Mod: CPTII,S$GLB,, | Performed by: PEDIATRICS

## 2021-10-21 RX ORDER — URINE GLUCOSE-ACET TEST STRIP
STRIP MISCELLANEOUS
Qty: 300 STRIP | Refills: 2 | Status: SHIPPED | OUTPATIENT
Start: 2021-10-21

## 2021-10-21 RX ORDER — GLUCAGON 3 MG/1
1 POWDER NASAL
Qty: 2 EACH | Refills: 2 | Status: SHIPPED | OUTPATIENT
Start: 2021-10-21 | End: 2023-10-23 | Stop reason: SDUPTHER

## 2022-01-03 ENCOUNTER — OFFICE VISIT (OUTPATIENT)
Dept: ENDOCRINOLOGY | Facility: CLINIC | Age: 22
End: 2022-01-03
Payer: COMMERCIAL

## 2022-01-03 VITALS
HEART RATE: 70 BPM | HEIGHT: 66 IN | BODY MASS INDEX: 28.88 KG/M2 | DIASTOLIC BLOOD PRESSURE: 72 MMHG | WEIGHT: 179.69 LBS | OXYGEN SATURATION: 99 % | SYSTOLIC BLOOD PRESSURE: 128 MMHG

## 2022-01-03 DIAGNOSIS — Z96.41 INSULIN PUMP STATUS: ICD-10-CM

## 2022-01-03 DIAGNOSIS — E10.65 TYPE 1 DIABETES MELLITUS WITH HYPERGLYCEMIA: Primary | ICD-10-CM

## 2022-01-03 DIAGNOSIS — E10.649 UNCONTROLLED TYPE 1 DIABETES MELLITUS WITH HYPOGLYCEMIA WITHOUT COMA: ICD-10-CM

## 2022-01-03 PROCEDURE — 95251 CONT GLUC MNTR ANALYSIS I&R: CPT | Mod: S$GLB,,, | Performed by: INTERNAL MEDICINE

## 2022-01-03 PROCEDURE — 99999 PR PBB SHADOW E&M-EST. PATIENT-LVL IV: CPT | Mod: PBBFAC,,, | Performed by: INTERNAL MEDICINE

## 2022-01-03 PROCEDURE — 99999 PR PBB SHADOW E&M-EST. PATIENT-LVL IV: ICD-10-PCS | Mod: PBBFAC,,, | Performed by: INTERNAL MEDICINE

## 2022-01-03 PROCEDURE — 3078F PR MOST RECENT DIASTOLIC BLOOD PRESSURE < 80 MM HG: ICD-10-PCS | Mod: CPTII,S$GLB,, | Performed by: INTERNAL MEDICINE

## 2022-01-03 PROCEDURE — 3078F DIAST BP <80 MM HG: CPT | Mod: CPTII,S$GLB,, | Performed by: INTERNAL MEDICINE

## 2022-01-03 PROCEDURE — 1159F MED LIST DOCD IN RCRD: CPT | Mod: CPTII,S$GLB,, | Performed by: INTERNAL MEDICINE

## 2022-01-03 PROCEDURE — 3074F PR MOST RECENT SYSTOLIC BLOOD PRESSURE < 130 MM HG: ICD-10-PCS | Mod: CPTII,S$GLB,, | Performed by: INTERNAL MEDICINE

## 2022-01-03 PROCEDURE — 3008F BODY MASS INDEX DOCD: CPT | Mod: CPTII,S$GLB,, | Performed by: INTERNAL MEDICINE

## 2022-01-03 PROCEDURE — 95251 PR GLUCOSE MONITOR, 72 HOUR, PHYS INTERP: ICD-10-PCS | Mod: S$GLB,,, | Performed by: INTERNAL MEDICINE

## 2022-01-03 PROCEDURE — 99204 OFFICE O/P NEW MOD 45 MIN: CPT | Mod: 25,S$GLB,, | Performed by: INTERNAL MEDICINE

## 2022-01-03 PROCEDURE — 1160F PR REVIEW ALL MEDS BY PRESCRIBER/CLIN PHARMACIST DOCUMENTED: ICD-10-PCS | Mod: CPTII,S$GLB,, | Performed by: INTERNAL MEDICINE

## 2022-01-03 PROCEDURE — 3008F PR BODY MASS INDEX (BMI) DOCUMENTED: ICD-10-PCS | Mod: CPTII,S$GLB,, | Performed by: INTERNAL MEDICINE

## 2022-01-03 PROCEDURE — 99204 PR OFFICE/OUTPT VISIT, NEW, LEVL IV, 45-59 MIN: ICD-10-PCS | Mod: 25,S$GLB,, | Performed by: INTERNAL MEDICINE

## 2022-01-03 PROCEDURE — 1159F PR MEDICATION LIST DOCUMENTED IN MEDICAL RECORD: ICD-10-PCS | Mod: CPTII,S$GLB,, | Performed by: INTERNAL MEDICINE

## 2022-01-03 PROCEDURE — 1160F RVW MEDS BY RX/DR IN RCRD: CPT | Mod: CPTII,S$GLB,, | Performed by: INTERNAL MEDICINE

## 2022-01-03 PROCEDURE — 3074F SYST BP LT 130 MM HG: CPT | Mod: CPTII,S$GLB,, | Performed by: INTERNAL MEDICINE

## 2022-01-03 RX ORDER — PEN NEEDLE, DIABETIC 30 GX3/16"
1 NEEDLE, DISPOSABLE MISCELLANEOUS DAILY
Qty: 30 EACH | Refills: 3 | Status: SHIPPED | OUTPATIENT
Start: 2022-01-03

## 2022-01-03 RX ORDER — INSULIN DEGLUDEC 100 U/ML
32 INJECTION, SOLUTION SUBCUTANEOUS DAILY
Qty: 1 PEN | Refills: 2 | Status: SHIPPED | OUTPATIENT
Start: 2022-01-03 | End: 2023-01-03

## 2022-01-03 NOTE — ASSESSMENT & PLAN NOTE
Pump and sensor download reviewed and annabelleearmartin excellent control  Meeting goals for TIR  Some lows or frequent pauses during the day when walking    Will reduce 6 am basal rate to 1.15 units/hr  Otherwise continue current settings    Reviewed use of Baqsimi and pump back-up plan today  Patient Instructions   Pump backup plan    If the insulin pump is non functional and discontinued for anticipated more than 20 hours, please give daily injections of:  Long acting insulin:Tresiba 32 units daily  Short acting insulin:  humalog with carb ratio of 1 unit per 7 grams and correction 1 unit per 50 points above 150    When the insulin pump is restarted, do not restart basal rates until at least 22 hours after the last long acting insulin injection. You can set a 0% temporary basal setting that will last until this time and use your pump to bolus for meals and correction.    For any technical insulin pump issues, please contact the insulin pump company; the toll free number is printed on the label on the back of the insulin pump.        Labs before next visit

## 2022-01-03 NOTE — PROGRESS NOTES
Cindy Garcia is a 21 y.o. male  referred by Allison Hubbard for evaluation of T1DM    Previously seen by law garcia and [facility] care    History of Present Illness  T1DM  Diagnosed at age 5  Known complications: none    Doing well since last visit with pediatric endocrine. No specific concerns today      Current Diabetes Regimen:  Tandem TSlim with Control IQ, humalog      Timing of prandial insulin: before meals minutes before meals.  Omitted doses: denies            Last Hgb A1C:  Lab Results   Component Value Date    HGBA1C 6.2 (H) 10/21/2021       Glucose Monitoring:  Meter/CGM: Dexcom G6. On pump report    Hypoglycemic Episodes: couple of episodes a week  Often associated with walking on campus    DKA: last when he was in middle school, none recent    Screening / DM Complications:    Nephropathy:  ACEi/ARB: Not taking  Lab Results   Component Value Date    MICALBCREAT 2.8 06/21/2021       Last Lipid Panel:  Statin: Not taking  Lab Results   Component Value Date    LDLCALC 98.6 06/21/2021       Last foot exam : 10/21/2021  Last eye exam : 08/18/2021;  no laser surgery or DR    Lab Results   Component Value Date    TSH 1.239 06/21/2021       Lab Results   Component Value Date    TTGIGA 4 03/04/2019       At [employer], studying animal sciences      Current Outpatient Medications:     blood sugar diagnostic (ONETOUCH VERIO) Strp, Use for blood glucose testing 8 x daily;before meals,snack,bed and prn symptoms of hypo/hyperglycemia, Disp: 600 each, Rfl: 3    blood-glucose sensor (DEXCOM G6 SENSOR) Lucy, Use for continuous glucose monitoring;change as needed up to 10 day wear. 3 month supply, Disp: 9 each, Rfl: 12    blood-glucose transmitter (DEXCOM G6 TRANSMITTER) Lucy, Use with dexcom sensor for continuous glucose monitoring. 3 month supply, Disp: 2 Device, Rfl: 4    FREESTYLE LANCETS 28 gauge lancets, 1 lancet by Misc.(Non-Drug; Combo Route) route Daily. Up to 8 times a day, Disp: 750 each, Rfl: 2    glucagon  "(BAQSIMI) 3 mg/actuation Spry, 1 spray by Nasal route as needed (hypoglycemia)., Disp: 2 each, Rfl: 2    insulin lispro (HUMALOG U-100 INSULIN) 100 unit/mL injection, PLACE 200 UNITS IN PUMP EVERY TWO DAYS. 3 months supply, Disp: 90 mL, Rfl: 1    insulin syringe-needle U-100 1 mL 31 x 5/16" Syrg, Use as needed for insulin 4 times/day, Disp: 50 each, Rfl: 3    urine glucose-ketones test (KETO-DIASTIX) Strp, Check ketones if blood sugar > 300. 3 months supply, Disp: 300 strip, Rfl: 2    insulin degludec (TRESIBA FLEXTOUCH U-100) 100 unit/mL (3 mL) insulin pen, Inject 32 Units into the skin once daily. To use in case of pump failure, Disp: 1 pen, Rfl: 2    pen needle, diabetic (BD ULTRA-FINE DESHAWN PEN NEEDLE) 32 gauge x 5/32" Ndle, 1 each by Misc.(Non-Drug; Combo Route) route once daily., Disp: 30 each, Rfl: 3    ROS as above    Objective:     Vitals:    01/03/22 1447   BP: 128/72   Pulse: 70     Wt Readings from Last 3 Encounters:   01/03/22 81.5 kg (179 lb 10.8 oz)   10/21/21 79.5 kg (175 lb 2.5 oz)   06/21/21 78.5 kg (173 lb 1 oz)     Body mass index is 29.22 kg/m².  Physical Exam  Constitutional:       Appearance: He is well-developed and well-nourished.   HENT:      Head: Normocephalic.   Eyes:      Extraocular Movements: EOM normal.      Conjunctiva/sclera: Conjunctivae normal.   Pulmonary:      Effort: Pulmonary effort is normal.   Abdominal:      Comments: No lipohypertrophy   Musculoskeletal:         General: No edema. Normal range of motion.   Skin:     General: Skin is warm.      Findings: No rash.   Neurological:      Mental Status: He is alert and oriented to person, place, and time.         Labs    Chemistry        Component Value Date/Time     10/07/2013 1803    K 3.8 10/07/2013 1803     10/07/2013 1803    CO2 27 10/07/2013 1803    BUN 10 10/07/2013 1803    CREATININE 0.8 10/07/2013 1803     (H) 10/07/2013 1803        Component Value Date/Time    CALCIUM 10.8 (H) 10/07/2013 1803    " ALKPHOS 336 10/07/2013 1803    AST 23 10/07/2013 1803    ALT 30 10/07/2013 1803    BILITOT 0.3 10/07/2013 1803    ESTGFRAFRICA >60 06/18/2013 1223    EGFRNONAA  10/07/2013 1803      Comment:      Calculation used to obtain the estimated glomerular filtration  rate (eGFR) is the CKD-EPI equation. Since race is unknown   in our information system, the eGFR values for   -American and Non--American patients are given   for each creatinine result.              Assessment and Plan     Type 1 diabetes mellitus, uncontrolled  Pump and sensor download reviewed and ovearll excellent control  Meeting goals for TIR  Some lows or frequent pauses during the day when walking    Will reduce 6 am basal rate to 1.15 units/hr  Otherwise continue current settings    Reviewed use of Baqsimi and pump back-up plan today  Patient Instructions   Pump backup plan    If the insulin pump is non functional and discontinued for anticipated more than 20 hours, please give daily injections of:  Long acting insulin:Tresiba 32 units daily  Short acting insulin:  humalog with carb ratio of 1 unit per 7 grams and correction 1 unit per 50 points above 150    When the insulin pump is restarted, do not restart basal rates until at least 22 hours after the last long acting insulin injection. You can set a 0% temporary basal setting that will last until this time and use your pump to bolus for meals and correction.    For any technical insulin pump issues, please contact the insulin pump company; the toll free number is printed on the label on the back of the insulin pump.        Labs before next visit    Insulin pump status  See above        RTC 6 months with labs before    Face to Face time with patient: 25 minutes  40 minutes of total time spent on the encounter, which includes face to face time and non-face to face time preparing to see the patient (eg, review of tests), Obtaining and/or reviewing separately obtained history, Documenting  clinical information in the electronic or other health record, Independently interpreting results (not separately reported) and communicating results to the patient/family/caregiver, or Care coordination (not separately reported).             Penelope Sol MD

## 2022-01-03 NOTE — PATIENT INSTRUCTIONS
Pump backup plan    If the insulin pump is non functional and discontinued for anticipated more than 20 hours, please give daily injections of:  Long acting insulin:Tresiba 32 units daily  Short acting insulin:  humalog with carb ratio of 1 unit per 7 grams and correction 1 unit per 50 points above 150    When the insulin pump is restarted, do not restart basal rates until at least 22 hours after the last long acting insulin injection. You can set a 0% temporary basal setting that will last until this time and use your pump to bolus for meals and correction.    For any technical insulin pump issues, please contact the insulin pump company; the toll free number is printed on the label on the back of the insulin pump.

## 2022-01-19 ENCOUNTER — PATIENT MESSAGE (OUTPATIENT)
Dept: ENDOCRINOLOGY | Facility: CLINIC | Age: 22
End: 2022-01-19
Payer: COMMERCIAL

## 2022-03-17 ENCOUNTER — PATIENT MESSAGE (OUTPATIENT)
Dept: ENDOCRINOLOGY | Facility: CLINIC | Age: 22
End: 2022-03-17
Payer: COMMERCIAL

## 2022-03-17 DIAGNOSIS — E10.65 TYPE 1 DIABETES MELLITUS WITH HYPERGLYCEMIA: ICD-10-CM

## 2022-03-17 RX ORDER — INSULIN LISPRO 100 [IU]/ML
INJECTION, SOLUTION INTRAVENOUS; SUBCUTANEOUS
Qty: 90 ML | Refills: 3 | Status: SHIPPED | OUTPATIENT
Start: 2022-03-17 | End: 2023-03-16 | Stop reason: SDUPTHER

## 2022-07-25 ENCOUNTER — PATIENT MESSAGE (OUTPATIENT)
Dept: ENDOCRINOLOGY | Facility: CLINIC | Age: 22
End: 2022-07-25
Payer: COMMERCIAL

## 2022-07-25 DIAGNOSIS — E10.65 TYPE 1 DIABETES MELLITUS WITH HYPERGLYCEMIA: ICD-10-CM

## 2022-07-25 DIAGNOSIS — E10.65 UNCONTROLLED TYPE 1 DIABETES MELLITUS WITH HYPERGLYCEMIA: ICD-10-CM

## 2022-07-25 RX ORDER — BLOOD-GLUCOSE TRANSMITTER
1 EACH MISCELLANEOUS
Qty: 1 EACH | Refills: 3 | Status: SHIPPED | OUTPATIENT
Start: 2022-07-25 | End: 2023-10-02 | Stop reason: SDUPTHER

## 2022-07-25 RX ORDER — BLOOD-GLUCOSE SENSOR
1 EACH MISCELLANEOUS
Qty: 9 EACH | Refills: 3 | Status: SHIPPED | OUTPATIENT
Start: 2022-07-25 | End: 2023-06-29 | Stop reason: SDUPTHER

## 2022-08-01 ENCOUNTER — LAB VISIT (OUTPATIENT)
Dept: LAB | Facility: HOSPITAL | Age: 22
End: 2022-08-01
Attending: INTERNAL MEDICINE
Payer: COMMERCIAL

## 2022-08-01 ENCOUNTER — OFFICE VISIT (OUTPATIENT)
Dept: ENDOCRINOLOGY | Facility: CLINIC | Age: 22
End: 2022-08-01
Payer: COMMERCIAL

## 2022-08-01 VITALS
WEIGHT: 182.31 LBS | HEART RATE: 83 BPM | OXYGEN SATURATION: 98 % | BODY MASS INDEX: 29.3 KG/M2 | DIASTOLIC BLOOD PRESSURE: 92 MMHG | SYSTOLIC BLOOD PRESSURE: 140 MMHG | HEIGHT: 66 IN

## 2022-08-01 DIAGNOSIS — E10.649 UNCONTROLLED TYPE 1 DIABETES MELLITUS WITH HYPOGLYCEMIA WITHOUT COMA: ICD-10-CM

## 2022-08-01 DIAGNOSIS — E10.65 TYPE 1 DIABETES MELLITUS WITH HYPERGLYCEMIA: Primary | ICD-10-CM

## 2022-08-01 DIAGNOSIS — E10.65 TYPE 1 DIABETES MELLITUS WITH HYPERGLYCEMIA: ICD-10-CM

## 2022-08-01 DIAGNOSIS — Z96.41 INSULIN PUMP STATUS: ICD-10-CM

## 2022-08-01 LAB
ALBUMIN SERPL BCP-MCNC: 4.4 G/DL (ref 3.5–5.2)
ALP SERPL-CCNC: 72 U/L (ref 55–135)
ALT SERPL W/O P-5'-P-CCNC: 35 U/L (ref 10–44)
ANION GAP SERPL CALC-SCNC: 9 MMOL/L (ref 8–16)
AST SERPL-CCNC: 23 U/L (ref 10–40)
BILIRUB SERPL-MCNC: 0.3 MG/DL (ref 0.1–1)
BUN SERPL-MCNC: 14 MG/DL (ref 6–20)
CALCIUM SERPL-MCNC: 10.2 MG/DL (ref 8.7–10.5)
CHLORIDE SERPL-SCNC: 104 MMOL/L (ref 95–110)
CO2 SERPL-SCNC: 27 MMOL/L (ref 23–29)
CREAT SERPL-MCNC: 1 MG/DL (ref 0.5–1.4)
EST. GFR  (NO RACE VARIABLE): >60 ML/MIN/1.73 M^2
ESTIMATED AVG GLUCOSE: 137 MG/DL (ref 68–131)
GLUCOSE SERPL-MCNC: 88 MG/DL (ref 70–110)
HBA1C MFR BLD: 6.4 % (ref 4–5.6)
POTASSIUM SERPL-SCNC: 3.7 MMOL/L (ref 3.5–5.1)
PROT SERPL-MCNC: 7.9 G/DL (ref 6–8.4)
SODIUM SERPL-SCNC: 140 MMOL/L (ref 136–145)
TSH SERPL DL<=0.005 MIU/L-ACNC: 1.77 UIU/ML (ref 0.4–4)

## 2022-08-01 PROCEDURE — 99214 OFFICE O/P EST MOD 30 MIN: CPT | Mod: 25,S$GLB,, | Performed by: INTERNAL MEDICINE

## 2022-08-01 PROCEDURE — 84443 ASSAY THYROID STIM HORMONE: CPT | Performed by: INTERNAL MEDICINE

## 2022-08-01 PROCEDURE — 36415 COLL VENOUS BLD VENIPUNCTURE: CPT | Performed by: INTERNAL MEDICINE

## 2022-08-01 PROCEDURE — 99999 PR PBB SHADOW E&M-EST. PATIENT-LVL V: ICD-10-PCS | Mod: PBBFAC,,, | Performed by: INTERNAL MEDICINE

## 2022-08-01 PROCEDURE — 83036 HEMOGLOBIN GLYCOSYLATED A1C: CPT | Performed by: INTERNAL MEDICINE

## 2022-08-01 PROCEDURE — 95251 CONT GLUC MNTR ANALYSIS I&R: CPT | Mod: S$GLB,,, | Performed by: INTERNAL MEDICINE

## 2022-08-01 PROCEDURE — 3077F PR MOST RECENT SYSTOLIC BLOOD PRESSURE >= 140 MM HG: ICD-10-PCS | Mod: CPTII,S$GLB,, | Performed by: INTERNAL MEDICINE

## 2022-08-01 PROCEDURE — 99214 PR OFFICE/OUTPT VISIT, EST, LEVL IV, 30-39 MIN: ICD-10-PCS | Mod: 25,S$GLB,, | Performed by: INTERNAL MEDICINE

## 2022-08-01 PROCEDURE — 3080F PR MOST RECENT DIASTOLIC BLOOD PRESSURE >= 90 MM HG: ICD-10-PCS | Mod: CPTII,S$GLB,, | Performed by: INTERNAL MEDICINE

## 2022-08-01 PROCEDURE — 1160F RVW MEDS BY RX/DR IN RCRD: CPT | Mod: CPTII,S$GLB,, | Performed by: INTERNAL MEDICINE

## 2022-08-01 PROCEDURE — 80053 COMPREHEN METABOLIC PANEL: CPT | Performed by: INTERNAL MEDICINE

## 2022-08-01 PROCEDURE — 3080F DIAST BP >= 90 MM HG: CPT | Mod: CPTII,S$GLB,, | Performed by: INTERNAL MEDICINE

## 2022-08-01 PROCEDURE — 1160F PR REVIEW ALL MEDS BY PRESCRIBER/CLIN PHARMACIST DOCUMENTED: ICD-10-PCS | Mod: CPTII,S$GLB,, | Performed by: INTERNAL MEDICINE

## 2022-08-01 PROCEDURE — 3077F SYST BP >= 140 MM HG: CPT | Mod: CPTII,S$GLB,, | Performed by: INTERNAL MEDICINE

## 2022-08-01 PROCEDURE — 1159F PR MEDICATION LIST DOCUMENTED IN MEDICAL RECORD: ICD-10-PCS | Mod: CPTII,S$GLB,, | Performed by: INTERNAL MEDICINE

## 2022-08-01 PROCEDURE — 3008F BODY MASS INDEX DOCD: CPT | Mod: CPTII,S$GLB,, | Performed by: INTERNAL MEDICINE

## 2022-08-01 PROCEDURE — 95251 PR GLUCOSE MONITOR, 72 HOUR, PHYS INTERP: ICD-10-PCS | Mod: S$GLB,,, | Performed by: INTERNAL MEDICINE

## 2022-08-01 PROCEDURE — 1159F MED LIST DOCD IN RCRD: CPT | Mod: CPTII,S$GLB,, | Performed by: INTERNAL MEDICINE

## 2022-08-01 PROCEDURE — 3008F PR BODY MASS INDEX (BMI) DOCUMENTED: ICD-10-PCS | Mod: CPTII,S$GLB,, | Performed by: INTERNAL MEDICINE

## 2022-08-01 PROCEDURE — 99999 PR PBB SHADOW E&M-EST. PATIENT-LVL V: CPT | Mod: PBBFAC,,, | Performed by: INTERNAL MEDICINE

## 2022-08-01 NOTE — ASSESSMENT & PLAN NOTE
Pump and sensor report reviewed and significant for TIR 80% but with 5% lows usually afternoon and evening in settings of higher basal rates    Will make following changes:  Basal rates:  MN 1.0  6 AM 1.3  12 PM 1.1  5 PM 0.9    CR 1:7  ISF 1:40    Labs today  Paperwork for school complete    Cont annual eye exams

## 2022-08-01 NOTE — PROGRESS NOTES
Cindy Garcia is a 21 y.o. male  presenting for follow-up of  T1DM    History of Present Illness  T1DM  Diagnosed at age 5  Known complications: none    Doing well recently. Some connectivity issues with pump. He has to reprogram settings and made daytime basal a bit stronger    Current Diabetes Regimen:  Tandem TSlim with Control IQ, humalog      Timing of prandial insulin: before meals minutes before meals.  Omitted doses: denies      Last Hgb A1C:  Lab Results   Component Value Date    HGBA1C 6.2 (H) 10/21/2021       Glucose Monitoring:  Meter/CGM: Dexcom G6. On pump report        Hypoglycemic Episodes: see above, often in afternoon and evening    DKA: last when he was in middle school, none recent    Screening / DM Complications:    Nephropathy:  ACEi/ARB: Not taking  Lab Results   Component Value Date    MICALBCREAT 2.8 06/21/2021       Last Lipid Panel:  Statin: Not taking  Lab Results   Component Value Date    LDLCALC 98.6 06/21/2021       Last foot exam : 10/21/2021  Last eye exam : 08/18/2021;  no laser surgery or DR    Lab Results   Component Value Date    TSH 1.239 06/21/2021       Lab Results   Component Value Date    TTGIGA 4 03/04/2019       At [employer], studying animal sciences      Current Outpatient Medications:     blood sugar diagnostic (ONETOUCH VERIO) Strp, Use for blood glucose testing 8 x daily;before meals,snack,bed and prn symptoms of hypo/hyperglycemia, Disp: 600 each, Rfl: 3    blood-glucose sensor (DEXCOM G6 SENSOR) Lucy, 1 Device by Misc.(Non-Drug; Combo Route) route every 10 days. Use with Dexcom transmitter for continuous glucose monitoring, Disp: 9 each, Rfl: 3    blood-glucose transmitter (DEXCOM G6 TRANSMITTER) Lucy, 1 Device by Misc.(Non-Drug; Combo Route) route every 3 (three) months. Use with dexcom sensor for continuous glucose monitoring, Disp: 1 each, Rfl: 3    FREESTYLE LANCETS 28 gauge lancets, 1 lancet by Misc.(Non-Drug; Combo Route) route Daily. Up to 8 times a day, Disp:  "750 each, Rfl: 2    glucagon (BAQSIMI) 3 mg/actuation Spry, 1 spray by Nasal route as needed (hypoglycemia)., Disp: 2 each, Rfl: 2    insulin degludec (TRESIBA FLEXTOUCH U-100) 100 unit/mL (3 mL) insulin pen, Inject 32 Units into the skin once daily. To use in case of pump failure, Disp: 1 pen, Rfl: 2    insulin lispro (HUMALOG U-100 INSULIN) 100 unit/mL injection, PLACE 200 UNITS IN PUMP EVERY TWO DAYS. 3 months supply, Disp: 90 mL, Rfl: 3    insulin syringe-needle U-100 1 mL 31 x 5/16" Syrg, Use as needed for insulin 4 times/day, Disp: 50 each, Rfl: 3    pen needle, diabetic (BD ULTRA-FINE DESHAWN PEN NEEDLE) 32 gauge x 5/32" Ndle, 1 each by Misc.(Non-Drug; Combo Route) route once daily., Disp: 30 each, Rfl: 3    urine glucose-ketones test (KETO-DIASTIX) Strp, Check ketones if blood sugar > 300. 3 months supply, Disp: 300 strip, Rfl: 2    ROS as above    Objective:     Vitals:    08/01/22 1524   BP: (!) 140/92   Pulse: 83     Wt Readings from Last 3 Encounters:   08/01/22 82.7 kg (182 lb 5.1 oz)   01/03/22 81.5 kg (179 lb 10.8 oz)   10/21/21 79.5 kg (175 lb 2.5 oz)     Body mass index is 29.65 kg/m².  Physical Exam  Constitutional:       Appearance: He is well-developed.   HENT:      Head: Normocephalic.   Eyes:      Conjunctiva/sclera: Conjunctivae normal.   Pulmonary:      Effort: Pulmonary effort is normal.   Abdominal:      Comments: No lipohypertrophy   Musculoskeletal:         General: Normal range of motion.   Skin:     General: Skin is warm.      Findings: No rash.   Neurological:      Mental Status: He is alert and oriented to person, place, and time.       Protective Sensation (w/ 10 gram monofilament):8/1/22  Right: Intact  Left: Intact    Visual Inspection:  Normal -  Bilateral    Pedal Pulses:   Right: Present  Left: Present    Posterior tibialis:   Right:Present  Left: Present      Labs    Chemistry        Component Value Date/Time     10/07/2013 1803    K 3.8 10/07/2013 1803     " 10/07/2013 1803    CO2 27 10/07/2013 1803    BUN 10 10/07/2013 1803    CREATININE 0.8 10/07/2013 1803     (H) 10/07/2013 1803        Component Value Date/Time    CALCIUM 10.8 (H) 10/07/2013 1803    ALKPHOS 336 10/07/2013 1803    AST 23 10/07/2013 1803    ALT 30 10/07/2013 1803    BILITOT 0.3 10/07/2013 1803    ESTGFRAFRICA >60 06/18/2013 1223    EGFRNONAA  10/07/2013 1803      Comment:      Calculation used to obtain the estimated glomerular filtration  rate (eGFR) is the CKD-EPI equation. Since race is unknown   in our information system, the eGFR values for   -American and Non--American patients are given   for each creatinine result.              Assessment and Plan     Type 1 diabetes mellitus, uncontrolled  Pump and sensor report reviewed and significant for TIR 80% but with 5% lows usually afternoon and evening in settings of higher basal rates    Will make following changes:  Basal rates:  MN 1.0  6 AM 1.3  12 PM 1.1  5 PM 0.9    CR 1:7  ISF 1:40    Labs today  Paperwork for school complete    Cont annual eye exams    Insulin pump status  See above    Having issues with pump charging and will call Tandem    RTC 6 months with labs before    Face to Face time with patient: 25 minutes  40 minutes of total time spent on the encounter, which includes face to face time and non-face to face time preparing to see the patient (eg, review of tests), Obtaining and/or reviewing separately obtained history, Documenting clinical information in the electronic or other health record, Independently interpreting results (not separately reported) and communicating results to the patient/family/caregiver, or Care coordination (not separately reported).             Penelope Sol MD

## 2022-12-16 LAB
LEFT EYE DM RETINOPATHY: NEGATIVE
RIGHT EYE DM RETINOPATHY: NEGATIVE

## 2022-12-19 ENCOUNTER — PATIENT OUTREACH (OUTPATIENT)
Dept: ADMINISTRATIVE | Facility: HOSPITAL | Age: 22
End: 2022-12-19
Payer: COMMERCIAL

## 2022-12-19 NOTE — PROGRESS NOTES
Health Maintenance Due   Topic Date Due    Pneumococcal Vaccines (Age 0-64) (1 - PCV) 12/24/2006    HPV Vaccines (1 - Male 2-dose series) Never done    HIV Screening  Never done    COVID-19 Vaccine (4 - Booster for Pfizer series) 01/14/2022    Lipid Panel  06/21/2022    TETANUS VACCINE  08/15/2022

## 2023-03-15 ENCOUNTER — TELEPHONE (OUTPATIENT)
Dept: ENDOCRINOLOGY | Facility: CLINIC | Age: 23
End: 2023-03-15
Payer: COMMERCIAL

## 2023-03-15 NOTE — TELEPHONE ENCOUNTER
----- Message from Flako Echeverria sent at 3/15/2023  1:22 PM CDT -----  Contact: pt  Pt requesting call back RE: returning call, tomorrow would be good.      Confirmed contact below:  Contact Name:Cindy Garcia  Phone Number: [phone]

## 2023-03-16 ENCOUNTER — PATIENT MESSAGE (OUTPATIENT)
Dept: ENDOCRINOLOGY | Facility: CLINIC | Age: 23
End: 2023-03-16

## 2023-03-16 ENCOUNTER — OFFICE VISIT (OUTPATIENT)
Dept: ENDOCRINOLOGY | Facility: CLINIC | Age: 23
End: 2023-03-16
Payer: COMMERCIAL

## 2023-03-16 DIAGNOSIS — Z96.41 INSULIN PUMP STATUS: ICD-10-CM

## 2023-03-16 DIAGNOSIS — E10.9 TYPE 1 DIABETES MELLITUS WITHOUT COMPLICATION: Primary | ICD-10-CM

## 2023-03-16 DIAGNOSIS — E10.65 TYPE 1 DIABETES MELLITUS WITH HYPERGLYCEMIA: ICD-10-CM

## 2023-03-16 PROCEDURE — 99214 PR OFFICE/OUTPT VISIT, EST, LEVL IV, 30-39 MIN: ICD-10-PCS | Mod: 25,95,, | Performed by: INTERNAL MEDICINE

## 2023-03-16 PROCEDURE — 1159F MED LIST DOCD IN RCRD: CPT | Mod: CPTII,95,, | Performed by: INTERNAL MEDICINE

## 2023-03-16 PROCEDURE — 1160F RVW MEDS BY RX/DR IN RCRD: CPT | Mod: CPTII,95,, | Performed by: INTERNAL MEDICINE

## 2023-03-16 PROCEDURE — 1159F PR MEDICATION LIST DOCUMENTED IN MEDICAL RECORD: ICD-10-PCS | Mod: CPTII,95,, | Performed by: INTERNAL MEDICINE

## 2023-03-16 PROCEDURE — 99214 OFFICE O/P EST MOD 30 MIN: CPT | Mod: 25,95,, | Performed by: INTERNAL MEDICINE

## 2023-03-16 PROCEDURE — 1160F PR REVIEW ALL MEDS BY PRESCRIBER/CLIN PHARMACIST DOCUMENTED: ICD-10-PCS | Mod: CPTII,95,, | Performed by: INTERNAL MEDICINE

## 2023-03-16 RX ORDER — INSULIN LISPRO 100 [IU]/ML
INJECTION, SOLUTION INTRAVENOUS; SUBCUTANEOUS
Qty: 90 ML | Refills: 3 | Status: SHIPPED | OUTPATIENT
Start: 2023-03-16 | End: 2024-02-27

## 2023-03-16 RX ORDER — INSULIN DEGLUDEC 100 U/ML
INJECTION, SOLUTION SUBCUTANEOUS
Start: 2023-03-16

## 2023-03-16 NOTE — PATIENT INSTRUCTIONS
Midnight 1.000 u/hr 1u:40 mg/dL 1u:7.0 g 120 mg/dL  6:00 AM 1.300 u/hr 1u:40 mg/dL 1u:6.0 g 120 mg/dL  11:00 AM 1.100 u/hr 1u:40 mg/dL 1u:7.0 g 120 mg/dL  5:00 PM 0.900 u/hr 1u:40 mg/dL 1u:7.0 g 120 mg/dL      Pump back-up plan     If the insulin pump is non functional and discontinued for anticipated more than 20 hours, please give daily injections of:  Long acting insulin:Tresiba 32 units daily  Short acting insulin:  humalog with carb ratio of 1 unit per 7 grams and correction 1 unit per 50 points above 150     For any technical insulin pump issues, please contact the insulin pump company; the toll free number is printed on the label on the back of the insulin pump.

## 2023-03-16 NOTE — PROGRESS NOTES
Cindy Garcia is a 22 y.o. male  presenting for follow-up of  T1DM    The patient location is: home in [place]  The chief complaint leading to consultation is:   Chief Complaint   Patient presents with    Diabetes       Visit type: audiovisual    Face to Face time with patient: 23 minutes  30 minutes of total time spent on the encounter, which includes face to face time and non-face to face time preparing to see the patient (eg, review of tests), Obtaining and/or reviewing separately obtained history, Documenting clinical information in the electronic or other health record, Independently interpreting results (not separately reported) and communicating results to the patient/family/caregiver, or Care coordination (not separately reported).    Each patient to whom he or she provides medical services by telemedicine is:  (1) informed of the relationship between the physician and patient and the respective role of any other health care provider with respect to management of the patient; and (2) notified that he or she may decline to receive medical services by telemedicine and may withdraw from such care at any time.      History of Present Illness  T1DM  Diagnosed at age 5  Known complications: none    Doing well since last visit. Asking about next pump as his is due for upgrade soon. On omnipod for many years in past and liked tubeless system    Some highs after breakfast but overall control has been very good    Current Diabetes Regimen:  Tandem TSlim with Control IQ, humalog  Midnight 1.000 u/hr 1u:40 mg/dL 1u:7.0 g 120 mg/dL  6:00 AM 1.300 u/hr 1u:40 mg/dL 1u:7.0 g 120 mg/dL  12:00 PM 1.100 u/hr 1u:40 mg/dL 1u:7.0 g 120 mg/dL  5:00 PM 0.900 u/hr 1u:40 mg/dL 1u:7.0 g 120 mg/dL    Timing of prandial insulin:   Omitted doses: denies    Backup basal: Tresiba  Glucagon: baqsimi      Last Hgb A1C:  Lab Results   Component Value Date    HGBA1C 6.4 (H) 08/01/2022       Glucose Monitoring:  Meter/CGM: Dexcom G6. On pump  "report        Hypoglycemic Episodes: see above, often in afternoon and evening    DKA: last when he was in middle school, none recent    Screening / DM Complications:    Nephropathy:  ACEi/ARB: Not taking  Lab Results   Component Value Date    MICALBCREAT Unable to calculate 08/01/2022       Last Lipid Panel:  Statin: Not taking  Lab Results   Component Value Date    LDLCALC 98.6 06/21/2021       Last foot exam : 08/01/2022  Last eye exam : 12/16/2022;  no laser surgery or DR    Lab Results   Component Value Date    TSH 1.767 08/01/2022       Lab Results   Component Value Date    TTGIGA 4 03/04/2019       At [facility], studying animal sciences      Current Outpatient Medications:     blood sugar diagnostic (ONETOUCH VERIO) Strp, Use for blood glucose testing 8 x daily;before meals,snack,bed and prn symptoms of hypo/hyperglycemia, Disp: 600 each, Rfl: 3    blood-glucose sensor (DEXCOM G6 SENSOR) Lucy, 1 Device by Misc.(Non-Drug; Combo Route) route every 10 days. Use with Dexcom transmitter for continuous glucose monitoring, Disp: 9 each, Rfl: 3    blood-glucose transmitter (DEXCOM G6 TRANSMITTER) Lucy, 1 Device by Misc.(Non-Drug; Combo Route) route every 3 (three) months. Use with dexcom sensor for continuous glucose monitoring, Disp: 1 each, Rfl: 3    FREESTYLE LANCETS 28 gauge lancets, 1 lancet by Misc.(Non-Drug; Combo Route) route Daily. Up to 8 times a day, Disp: 750 each, Rfl: 2    glucagon (BAQSIMI) 3 mg/actuation Spry, 1 spray by Nasal route as needed (hypoglycemia)., Disp: 2 each, Rfl: 2    insulin degludec (TRESIBA FLEXTOUCH U-100) 100 unit/mL (3 mL) insulin pen, To use in instance of pump failure, take 32 units daily, Disp: , Rfl:     insulin lispro (HUMALOG U-100 INSULIN) 100 unit/mL injection, PLACE 200 UNITS IN PUMP EVERY TWO DAYS. 3 months supply, Disp: 90 mL, Rfl: 3    insulin syringe-needle U-100 1 mL 31 x 5/16" Syrg, Use as needed for insulin 4 times/day, Disp: 50 each, Rfl: 3    pen needle, diabetic (BD " "ULTRA-FINE DESHAWN PEN NEEDLE) 32 gauge x 5/32" Ndle, 1 each by Misc.(Non-Drug; Combo Route) route once daily., Disp: 30 each, Rfl: 3    urine glucose-ketones test (KETO-DIASTIX) Strp, Check ketones if blood sugar > 300. 3 months supply, Disp: 300 strip, Rfl: 2    ROS as above    Objective:     There were no vitals filed for this visit.    Wt Readings from Last 3 Encounters:   08/01/22 82.7 kg (182 lb 5.1 oz)   01/03/22 81.5 kg (179 lb 10.8 oz)   10/21/21 79.5 kg (175 lb 2.5 oz)     There is no height or weight on file to calculate BMI.    Protective Sensation (w/ 10 gram monofilament):8/1/22  Right: Intact  Left: Intact    Visual Inspection:  Normal -  Bilateral    Pedal Pulses:   Right: Present  Left: Present    Posterior tibialis:   Right:Present  Left: Present      Labs    Chemistry        Component Value Date/Time     08/01/2022 1634    K 3.7 08/01/2022 1634     08/01/2022 1634    CO2 27 08/01/2022 1634    BUN 14 08/01/2022 1634    CREATININE 1.0 08/01/2022 1634    GLU 88 08/01/2022 1634        Component Value Date/Time    CALCIUM 10.2 08/01/2022 1634    ALKPHOS 72 08/01/2022 1634    AST 23 08/01/2022 1634    ALT 35 08/01/2022 1634    BILITOT 0.3 08/01/2022 1634    ESTGFRAFRICA >60 06/18/2013 1223    EGFRNONAA  10/07/2013 1803      Comment:      Calculation used to obtain the estimated glomerular filtration  rate (eGFR) is the CKD-EPI equation. Since race is unknown   in our information system, the eGFR values for   -American and Non--American patients are given   for each creatinine result.              Assessment and Plan     Type 1 diabetes mellitus without complication  Pump and sensor download reviewed and significant for hyperglycemia after breakfast but overall meeting glycemic targets    Will make changes as below    Also discussed pump options including change to Omnipod 5 or continuing Tandem. Reviewed pros/cons of each. He will consider and discuss with parents and let me " know    Patient Instructions   Midnight 1.000 u/hr 1u:40 mg/dL 1u:7.0 g 120 mg/dL  6:00 AM 1.300 u/hr 1u:40 mg/dL 1u:6.0 g 120 mg/dL  11:00 AM 1.100 u/hr 1u:40 mg/dL 1u:7.0 g 120 mg/dL  5:00 PM 0.900 u/hr 1u:40 mg/dL 1u:7.0 g 120 mg/dL      Pump back-up plan     If the insulin pump is non functional and discontinued for anticipated more than 20 hours, please give daily injections of:  Long acting insulin:Tresiba 32 units daily  Short acting insulin:  humalog with carb ratio of 1 unit per 7 grams and correction 1 unit per 50 points above 150     For any technical insulin pump issues, please contact the insulin pump company; the toll free number is printed on the label on the back of the insulin pump.       Has baqsimi    A1c before next visit    Insulin pump status  As above        RTC 4 months with A1c before          Penelope Sol MD

## 2023-03-16 NOTE — ASSESSMENT & PLAN NOTE
Pump and sensor download reviewed and significant for hyperglycemia after breakfast but overall meeting glycemic targets    Will make changes as below    Also discussed pump options including change to Omnipod 5 or continuing Tandem. Reviewed pros/cons of each. He will consider and discuss with parents and let me know    Patient Instructions   Midnight 1.000 u/hr 1u:40 mg/dL 1u:7.0 g 120 mg/dL  6:00 AM 1.300 u/hr 1u:40 mg/dL 1u:6.0 g 120 mg/dL  11:00 AM 1.100 u/hr 1u:40 mg/dL 1u:7.0 g 120 mg/dL  5:00 PM 0.900 u/hr 1u:40 mg/dL 1u:7.0 g 120 mg/dL      Pump back-up plan     If the insulin pump is non functional and discontinued for anticipated more than 20 hours, please give daily injections of:  Long acting insulin:Tresiba 32 units daily  Short acting insulin:  humalog with carb ratio of 1 unit per 7 grams and correction 1 unit per 50 points above 150     For any technical insulin pump issues, please contact the insulin pump company; the toll free number is printed on the label on the back of the insulin pump.       Has baqsimi    A1c before next visit

## 2023-03-16 NOTE — Clinical Note
A1c in [facility] rou before virtual visit in pump end of June/early july

## 2023-04-11 ENCOUNTER — PATIENT MESSAGE (OUTPATIENT)
Dept: ENDOCRINOLOGY | Facility: CLINIC | Age: 23
End: 2023-04-11
Payer: COMMERCIAL

## 2023-06-05 ENCOUNTER — PATIENT MESSAGE (OUTPATIENT)
Dept: ENDOCRINOLOGY | Facility: CLINIC | Age: 23
End: 2023-06-05
Payer: COMMERCIAL

## 2023-06-29 ENCOUNTER — LAB VISIT (OUTPATIENT)
Dept: LAB | Facility: HOSPITAL | Age: 23
End: 2023-06-29
Attending: INTERNAL MEDICINE
Payer: COMMERCIAL

## 2023-06-29 ENCOUNTER — CLINICAL SUPPORT (OUTPATIENT)
Dept: ENDOCRINOLOGY | Facility: CLINIC | Age: 23
End: 2023-06-29
Payer: COMMERCIAL

## 2023-06-29 ENCOUNTER — PATIENT MESSAGE (OUTPATIENT)
Dept: ENDOCRINOLOGY | Facility: CLINIC | Age: 23
End: 2023-06-29

## 2023-06-29 DIAGNOSIS — E10.65 TYPE 1 DIABETES MELLITUS WITH HYPERGLYCEMIA: ICD-10-CM

## 2023-06-29 DIAGNOSIS — E10.9 TYPE 1 DIABETES MELLITUS WITHOUT COMPLICATION: ICD-10-CM

## 2023-06-29 DIAGNOSIS — Z96.41 INSULIN PUMP STATUS: ICD-10-CM

## 2023-06-29 DIAGNOSIS — E10.65 TYPE 1 DIABETES MELLITUS WITH HYPERGLYCEMIA: Primary | ICD-10-CM

## 2023-06-29 LAB
ESTIMATED AVG GLUCOSE: 143 MG/DL (ref 68–131)
HBA1C MFR BLD: 6.6 % (ref 4–5.6)

## 2023-06-29 PROCEDURE — 83036 HEMOGLOBIN GLYCOSYLATED A1C: CPT | Performed by: INTERNAL MEDICINE

## 2023-06-29 PROCEDURE — 99214 OFFICE O/P EST MOD 30 MIN: CPT | Mod: 25,95,, | Performed by: INTERNAL MEDICINE

## 2023-06-29 PROCEDURE — 99214 PR OFFICE/OUTPT VISIT, EST, LEVL IV, 30-39 MIN: ICD-10-PCS | Mod: 25,95,, | Performed by: INTERNAL MEDICINE

## 2023-06-29 PROCEDURE — 36415 COLL VENOUS BLD VENIPUNCTURE: CPT | Performed by: INTERNAL MEDICINE

## 2023-06-29 RX ORDER — BLOOD-GLUCOSE SENSOR
1 EACH MISCELLANEOUS
Qty: 9 EACH | Refills: 3 | Status: SHIPPED | OUTPATIENT
Start: 2023-06-29

## 2023-06-29 NOTE — PATIENT INSTRUCTIONS
Midnight 1.000 u/hr 1u:35 mg/dL 1u:7.0 g 120 mg/dL  6:00 AM 1.300 u/hr 1u:30 mg/dL 1u:5.5 g 120 mg/dL  11:00 AM 1.100 u/hr 1u:30 mg/dL 1u:7.0 g 120 mg/dL  5:00 PM 0.900 u/hr 1u:30 mg/dL 1u:7.0 g 120 mg/dL    Try not using extended boluses. Can bolus close to start time of meal for high/fat protein. You can always correct later if you need to    Instead of manual correction, increase sugar 10% so pump gives you higher dose

## 2023-06-29 NOTE — ASSESSMENT & PLAN NOTE
Pump and sensor report reviewed and with more variability. Highs in morning and lows in evening after extended bolus or manual bolus  Will do he following  Patient Instructions   Midnight 1.000 u/hr 1u:35 mg/dL 1u:7.0 g 120 mg/dL  6:00 AM 1.300 u/hr 1u:30 mg/dL 1u:5.5 g 120 mg/dL  11:00 AM 1.100 u/hr 1u:30 mg/dL 1u:7.0 g 120 mg/dL  5:00 PM 0.900 u/hr 1u:30 mg/dL 1u:7.0 g 120 mg/dL    Try not using extended boluses. Can bolus close to start time of meal for high/fat protein. You can always correct later if you need to    Instead of manual correction, increase sugar 10% so pump gives you higher dose

## 2023-06-29 NOTE — PROGRESS NOTES
Cindy Garcia is a 22 y.o. male  presenting for follow-up of  T1DM    The patient location is: home in [place]  The chief complaint leading to consultation is:   Chief Complaint   Patient presents with    Diabetes       Visit type: audiovisual    Face to Face time with patient: 18 minutes  30 minutes of total time spent on the encounter, which includes face to face time and non-face to face time preparing to see the patient (eg, review of tests), Obtaining and/or reviewing separately obtained history, Documenting clinical information in the electronic or other health record, Independently interpreting results (not separately reported) and communicating results to the patient/family/caregiver, or Care coordination (not separately reported).    Each patient to whom he or she provides medical services by telemedicine is:  (1) informed of the relationship between the physician and patient and the respective role of any other health care provider with respect to management of the patient; and (2) notified that he or she may decline to receive medical services by telemedicine and may withdraw from such care at any time.      History of Present Illness  T1DM  Diagnosed at age 5  Known complications: none    Life has been chaotic. In summer school and now with sick family members  Some lows early in day and lows at night    Current Diabetes Regimen:  Tandem TSlim with Control IQ, humalog  Midnight 1.000 u/hr 1u:40 mg/dL 1u:7.0 g 120 mg/dL  6:00 AM 1.300 u/hr 1u:40 mg/dL 1u:6.0 g 120 mg/dL  11:00 AM 1.100 u/hr 1u:40 mg/dL 1u:7.0 g 120 mg/dL  5:00 PM 0.900 u/hr 1u:40 mg/dL 1u:7.0 g 120 mg/dL    Timing of prandial insulin:   Omitted doses: denies    Backup basal: Tresiba  Glucagon: baqsimi      Last Hgb A1C:  Lab Results   Component Value Date    HGBA1C 6.4 (H) 08/01/2022       Glucose Monitoring:  Meter/CGM: Dexcom G6. On pump report        Hypoglycemic Episodes: often in evening    DKA: last when he was in middle school, none  "recent    Screening / DM Complications:    Nephropathy:  ACEi/ARB: Not taking  Lab Results   Component Value Date    MICALBCREAT Unable to calculate 08/01/2022       Last Lipid Panel:  Statin: Not taking  Lab Results   Component Value Date    LDLCALC 98.6 06/21/2021       Last foot exam : 08/01/2022  Last eye exam : 12/16/2022;  no laser surgery or DR    Lab Results   Component Value Date    TSH 1.767 08/01/2022       Lab Results   Component Value Date    TTGIGA 4 03/04/2019       At [facility] [employer], studying animal sciences      Current Outpatient Medications:     blood sugar diagnostic (ONETOUCH VERIO) Strp, Use for blood glucose testing 8 x daily;before meals,snack,bed and prn symptoms of hypo/hyperglycemia, Disp: 600 each, Rfl: 3    blood-glucose sensor (DEXCOM G6 SENSOR) Lucy, 1 Device by Misc.(Non-Drug; Combo Route) route every 10 days. Use with Dexcom transmitter for continuous glucose monitoring, Disp: 9 each, Rfl: 3    blood-glucose transmitter (DEXCOM G6 TRANSMITTER) Lucy, 1 Device by Misc.(Non-Drug; Combo Route) route every 3 (three) months. Use with dexcom sensor for continuous glucose monitoring, Disp: 1 each, Rfl: 3    FREESTYLE LANCETS 28 gauge lancets, 1 lancet by Misc.(Non-Drug; Combo Route) route Daily. Up to 8 times a day, Disp: 750 each, Rfl: 2    glucagon (BAQSIMI) 3 mg/actuation Spry, 1 spray by Nasal route as needed (hypoglycemia)., Disp: 2 each, Rfl: 2    insulin degludec (TRESIBA FLEXTOUCH U-100) 100 unit/mL (3 mL) insulin pen, To use in instance of pump failure, take 32 units daily, Disp: , Rfl:     insulin lispro (HUMALOG U-100 INSULIN) 100 unit/mL injection, PLACE 200 UNITS IN PUMP EVERY TWO DAYS. 3 months supply, Disp: 90 mL, Rfl: 3    insulin syringe-needle U-100 1 mL 31 x 5/16" Syrg, Use as needed for insulin 4 times/day, Disp: 50 each, Rfl: 3    pen needle, diabetic (BD ULTRA-FINE DESHAWN PEN NEEDLE) 32 gauge x 5/32" Ndle, 1 each by Misc.(Non-Drug; Combo Route) route once daily., Disp: 30 each, Rfl: " 3    urine glucose-ketones test (KETO-DIASTIX) Strp, Check ketones if blood sugar > 300. 3 months supply, Disp: 300 strip, Rfl: 2    ROS as above    Objective:     There were no vitals filed for this visit.    Wt Readings from Last 3 Encounters:   08/01/22 82.7 kg (182 lb 5.1 oz)   01/03/22 81.5 kg (179 lb 10.8 oz)   10/21/21 79.5 kg (175 lb 2.5 oz)     There is no height or weight on file to calculate BMI.    Protective Sensation (w/ 10 gram monofilament):8/1/22  Right: Intact  Left: Intact    Visual Inspection:  Normal -  Bilateral    Pedal Pulses:   Right: Present  Left: Present    Posterior tibialis:   Right:Present  Left: Present      Labs    Chemistry        Component Value Date/Time     08/01/2022 1634    K 3.7 08/01/2022 1634     08/01/2022 1634    CO2 27 08/01/2022 1634    BUN 14 08/01/2022 1634    CREATININE 1.0 08/01/2022 1634    GLU 88 08/01/2022 1634        Component Value Date/Time    CALCIUM 10.2 08/01/2022 1634    ALKPHOS 72 08/01/2022 1634    AST 23 08/01/2022 1634    ALT 35 08/01/2022 1634    BILITOT 0.3 08/01/2022 1634    ESTGFRAFRICA >60 06/18/2013 1223    EGFRNONAA  10/07/2013 1803      Comment:      Calculation used to obtain the estimated glomerular filtration  rate (eGFR) is the CKD-EPI equation. Since race is unknown   in our information system, the eGFR values for   -American and Non--American patients are given   for each creatinine result.              Assessment and Plan     Type 1 diabetes mellitus without complication  Pump and sensor report reviewed and with more variability. Highs in morning and lows in evening after extended bolus or manual bolus  Will do he following  Patient Instructions   Midnight 1.000 u/hr 1u:35 mg/dL 1u:7.0 g 120 mg/dL  6:00 AM 1.300 u/hr 1u:30 mg/dL 1u:5.5 g 120 mg/dL  11:00 AM 1.100 u/hr 1u:30 mg/dL 1u:7.0 g 120 mg/dL  5:00 PM 0.900 u/hr 1u:30 mg/dL 1u:7.0 g 120 mg/dL    Try not using extended boluses. Can bolus close to start time  of meal for high/fat protein. You can always correct later if you need to    Instead of manual correction, increase sugar 10% so pump gives you higher dose        Insulin pump status  See above          RTC 4 months           Penelope Sol MD

## 2023-07-11 ENCOUNTER — PATIENT MESSAGE (OUTPATIENT)
Dept: ENDOCRINOLOGY | Facility: CLINIC | Age: 23
End: 2023-07-11
Payer: COMMERCIAL

## 2023-07-11 ENCOUNTER — TELEPHONE (OUTPATIENT)
Dept: ENDOCRINOLOGY | Facility: CLINIC | Age: 23
End: 2023-07-11
Payer: COMMERCIAL

## 2023-07-11 NOTE — TELEPHONE ENCOUNTER
----- Message from Flako Echeverria sent at 7/11/2023  4:19 PM CDT -----  Regarding: reschedule appt  Contact: [phone] pt  Pt requesting call back RE: would like to r/s 10/19 for another date. He has school, nothing available in epic    Confirmed contact below:  Contact Name:Cindy Garcia  Phone Number: [phone]

## 2023-10-02 DIAGNOSIS — E10.65 UNCONTROLLED TYPE 1 DIABETES MELLITUS WITH HYPERGLYCEMIA: ICD-10-CM

## 2023-10-02 RX ORDER — BLOOD-GLUCOSE TRANSMITTER
1 EACH MISCELLANEOUS
Qty: 1 EACH | Refills: 3 | Status: SHIPPED | OUTPATIENT
Start: 2023-10-02

## 2023-10-20 ENCOUNTER — TELEPHONE (OUTPATIENT)
Dept: ENDOCRINOLOGY | Facility: CLINIC | Age: 23
End: 2023-10-20
Payer: COMMERCIAL

## 2023-10-23 ENCOUNTER — CLINICAL SUPPORT (OUTPATIENT)
Dept: ENDOCRINOLOGY | Facility: CLINIC | Age: 23
End: 2023-10-23
Payer: COMMERCIAL

## 2023-10-23 DIAGNOSIS — E10.65 TYPE 1 DIABETES MELLITUS WITH HYPERGLYCEMIA: Primary | ICD-10-CM

## 2023-10-23 DIAGNOSIS — E16.2 HYPOGLYCEMIA: ICD-10-CM

## 2023-10-23 DIAGNOSIS — Z96.41 INSULIN PUMP STATUS: ICD-10-CM

## 2023-10-23 DIAGNOSIS — E10.9 TYPE 1 DIABETES MELLITUS WITHOUT COMPLICATION: ICD-10-CM

## 2023-10-23 PROCEDURE — 99214 OFFICE O/P EST MOD 30 MIN: CPT | Mod: 95,,, | Performed by: INTERNAL MEDICINE

## 2023-10-23 PROCEDURE — 95251 PR GLUCOSE MONITOR, 72 HOUR, PHYS INTERP: ICD-10-PCS | Mod: NDTC,,, | Performed by: INTERNAL MEDICINE

## 2023-10-23 PROCEDURE — 95251 CONT GLUC MNTR ANALYSIS I&R: CPT | Mod: NDTC,,, | Performed by: INTERNAL MEDICINE

## 2023-10-23 PROCEDURE — 99214 PR OFFICE/OUTPT VISIT, EST, LEVL IV, 30-39 MIN: ICD-10-PCS | Mod: 95,,, | Performed by: INTERNAL MEDICINE

## 2023-10-23 RX ORDER — GLUCAGON 3 MG/1
1 POWDER NASAL
Qty: 2 EACH | Refills: 2 | Status: SHIPPED | OUTPATIENT
Start: 2023-10-23

## 2023-10-23 NOTE — PATIENT INSTRUCTIONS
Make 11am segment start at 9 am  Change Carb ratio for 9 am segment to 1:6    Try activity mode at work especially in afternoons

## 2023-10-23 NOTE — PROGRESS NOTES
Cindy Garcia is a 22 y.o. male  presenting for follow-up of  T1DM    The patient location is: home in [place]  The chief complaint leading to consultation is:   Chief Complaint   Patient presents with    Diabetes       Visit type: audiovisual    Face to Face time with patient: 21 minutes  30 minutes of total time spent on the encounter, which includes face to face time and non-face to face time preparing to see the patient (eg, review of tests), Obtaining and/or reviewing separately obtained history, Documenting clinical information in the electronic or other health record, Independently interpreting results (not separately reported) and communicating results to the patient/family/caregiver, or Care coordination (not separately reported).    Each patient to whom he or she provides medical services by telemedicine is:  (1) informed of the relationship between the physician and patient and the respective role of any other health care provider with respect to management of the patient; and (2) notified that he or she may decline to receive medical services by telemedicine and may withdraw from such care at any time.      History of Present Illness  T1DM  Diagnosed at age 5  Known complications: none    Since last visit finds his sugars are more variable  Working at [employer] now at [employer] and some lows during work although difficult to predict if/when this will be. Works few hours some mornings and afternoons. Lows more likely in afternoon      Has not tried activity mode with work    His aunt  unexpectedly over the summer and finds this has also led to more difficulty with his control    Will graduate in December and moving back to [place]    Current Diabetes Regimen:  Tandem TSlim with Control IQ, humalog  Midnight 1.000 u/hr 1u:35 mg/dL 1u:7.0 g 110 mg/dL  6:00 AM 1.300 u/hr 1u:30 mg/dL 1u:5.0 g 110 mg/dL  11:00 AM 1.100 u/hr 1u:30 mg/dL 1u:7.0 g 110 mg/dL  5:00 PM 0.900 u/hr 1u:30 mg/dL 1u:7.0 g  110 mg/dL    Timing of prandial insulin:   Omitted doses: denies    Backup basal: Tresiba  Glucagon: baqsimi      Last Hgb A1C:  Lab Results   Component Value Date    HGBA1C 6.6 (H) 06/29/2023       Glucose Monitoring:  Meter/CGM: Dexcom G6. On pump report        Hypoglycemic Episodes: see above, often in afternoon and evening    DKA: last when he was in middle school, none recent    Screening / DM Complications:    Nephropathy:  ACEi/ARB: Not taking  Lab Results   Component Value Date    MICALBCREAT Unable to calculate 08/01/2022       Last Lipid Panel:  Statin: Not taking  Lab Results   Component Value Date    LDLCALC 98.6 06/21/2021       Last foot exam : 08/01/2022  Last eye exam : 12/16/2022;  no laser surgery or DR    Lab Results   Component Value Date    TSH 1.767 08/01/2022       Lab Results   Component Value Date    TTGIGA 4 03/04/2019       At [employer], studying animal sciences      Current Outpatient Medications:     blood sugar diagnostic (ONETOUCH VERIO) Strp, Use for blood glucose testing 8 x daily;before meals,snack,bed and prn symptoms of hypo/hyperglycemia, Disp: 600 each, Rfl: 3    blood-glucose sensor (DEXCOM G6 SENSOR) Lucy, 1 Device by Misc.(Non-Drug; Combo Route) route every 10 days. Use with Dexcom transmitter for continuous glucose monitoring, Disp: 9 each, Rfl: 3    blood-glucose transmitter (DEXCOM G6 TRANSMITTER) Lucy, 1 Device by Misc.(Non-Drug; Combo Route) route every 3 (three) months. Use with dexcom sensor for continuous glucose monitoring, Disp: 1 each, Rfl: 3    FREESTYLE LANCETS 28 gauge lancets, 1 lancet by Misc.(Non-Drug; Combo Route) route Daily. Up to 8 times a day, Disp: 750 each, Rfl: 2    glucagon (BAQSIMI) 3 mg/actuation Spry, 1 spray by Nasal route as needed (hypoglycemia)., Disp: 2 each, Rfl: 2    insulin degludec (TRESIBA FLEXTOUCH U-100) 100 unit/mL (3 mL) insulin pen, To use in instance of pump failure, take 32 units daily, Disp: , Rfl:     insulin lispro (HUMALOG U-100  "INSULIN) 100 unit/mL injection, PLACE 200 UNITS IN PUMP EVERY TWO DAYS. 3 months supply, Disp: 90 mL, Rfl: 3    insulin syringe-needle U-100 1 mL 31 x 5/16" Syrg, Use as needed for insulin 4 times/day, Disp: 50 each, Rfl: 3    pen needle, diabetic (BD ULTRA-FINE DESHAWN PEN NEEDLE) 32 gauge x 5/32" Ndle, 1 each by Misc.(Non-Drug; Combo Route) route once daily., Disp: 30 each, Rfl: 3    urine glucose-ketones test (KETO-DIASTIX) Strp, Check ketones if blood sugar > 300. 3 months supply, Disp: 300 strip, Rfl: 2    ROS as above    Objective:     There were no vitals filed for this visit.    Wt Readings from Last 3 Encounters:   08/01/22 82.7 kg (182 lb 5.1 oz)   01/03/22 81.5 kg (179 lb 10.8 oz)   10/21/21 79.5 kg (175 lb 2.5 oz)     There is no height or weight on file to calculate BMI.        Labs    Chemistry        Component Value Date/Time     08/01/2022 1634    K 3.7 08/01/2022 1634     08/01/2022 1634    CO2 27 08/01/2022 1634    BUN 14 08/01/2022 1634    CREATININE 1.0 08/01/2022 1634    GLU 88 08/01/2022 1634        Component Value Date/Time    CALCIUM 10.2 08/01/2022 1634    ALKPHOS 72 08/01/2022 1634    AST 23 08/01/2022 1634    ALT 35 08/01/2022 1634    BILITOT 0.3 08/01/2022 1634    ESTGFRAFRICA >60 06/18/2013 1223    EGFRNONAA  10/07/2013 1803      Comment:      Calculation used to obtain the estimated glomerular filtration  rate (eGFR) is the CKD-EPI equation. Since race is unknown   in our information system, the eGFR values for   -American and Non--American patients are given   for each creatinine result.              Assessment and Plan     Type 1 diabetes mellitus without complication  Pump and sensor download reviewed and with TIR 70% although with more variability with both hyperglycemia and hypoglycemia  Schedule changes from day to day and this has impact on insulin needs. He does bolus consistently but still with highs after lunch and in morning  Discussed trying activity " mode before going to work particularly in afternoon when more likely to have lows    Will adjust settings as follows:    Patient Instructions   Make 11am segment start at 9 am  Change Carb ratio for 9 am segment to 1:6    Try activity mode at work especially in afternoons          Insulin pump status  As above    Hypoglycemia  As above  baqsimi renewed as his           RTC 4 months with labs before. In person visit          Penelope Sol MD

## 2023-10-24 ENCOUNTER — PATIENT MESSAGE (OUTPATIENT)
Dept: ENDOCRINOLOGY | Facility: CLINIC | Age: 23
End: 2023-10-24
Payer: COMMERCIAL

## 2023-10-24 PROBLEM — E16.2 HYPOGLYCEMIA: Status: ACTIVE | Noted: 2023-10-24

## 2023-10-24 NOTE — ASSESSMENT & PLAN NOTE
Pump and sensor download reviewed and with TIR 70% although with more variability with both hyperglycemia and hypoglycemia  Schedule changes from day to day and this has impact on insulin needs. He does bolus consistently but still with highs after lunch and in morning  Discussed trying activity mode before going to work particularly in afternoon when more likely to have lows    Will adjust settings as follows:    Patient Instructions   Make 11am segment start at 9 am  Change Carb ratio for 9 am segment to 1:6    Try activity mode at work especially in afternoons

## 2023-11-16 LAB
LEFT EYE DM RETINOPATHY: NEGATIVE
RIGHT EYE DM RETINOPATHY: NEGATIVE

## 2023-11-29 ENCOUNTER — PATIENT OUTREACH (OUTPATIENT)
Dept: ADMINISTRATIVE | Facility: HOSPITAL | Age: 23
End: 2023-11-29
Payer: COMMERCIAL

## 2023-11-29 NOTE — PROGRESS NOTES
Health Maintenance Due   Topic Date Due    Pneumococcal Vaccines (Age 0-64) (1 - PCV) 12/24/2006    HPV Vaccines (1 - Male 2-dose series) Never done    HIV Screening  Never done    Lipid Panel  06/21/2022    TETANUS VACCINE  08/15/2022    Diabetes Urine Screening  08/01/2023    Foot Exam  08/01/2023    Influenza Vaccine (1) 09/01/2023    COVID-19 Vaccine (4 - 2023-24 season) 09/01/2023    Hemoglobin A1c  12/29/2023     Chart review done. HM updated. Immunizations reviewed & updated. Care Everywhere updated.

## 2024-01-10 ENCOUNTER — LAB VISIT (OUTPATIENT)
Dept: LAB | Facility: HOSPITAL | Age: 24
End: 2024-01-10
Attending: INTERNAL MEDICINE
Payer: COMMERCIAL

## 2024-01-10 DIAGNOSIS — E10.65 TYPE 1 DIABETES MELLITUS WITH HYPERGLYCEMIA: ICD-10-CM

## 2024-01-10 LAB
ALBUMIN SERPL BCP-MCNC: 4.3 G/DL (ref 3.5–5.2)
ALBUMIN/CREAT UR: NORMAL UG/MG (ref 0–30)
ALP SERPL-CCNC: 77 U/L (ref 55–135)
ALT SERPL W/O P-5'-P-CCNC: 44 U/L (ref 10–44)
ANION GAP SERPL CALC-SCNC: 12 MMOL/L (ref 8–16)
AST SERPL-CCNC: 30 U/L (ref 10–40)
BILIRUB SERPL-MCNC: 0.8 MG/DL (ref 0.1–1)
BUN SERPL-MCNC: 9 MG/DL (ref 6–20)
CALCIUM SERPL-MCNC: 10 MG/DL (ref 8.7–10.5)
CHLORIDE SERPL-SCNC: 103 MMOL/L (ref 95–110)
CHOLEST SERPL-MCNC: 192 MG/DL (ref 120–199)
CHOLEST/HDLC SERPL: 4 {RATIO} (ref 2–5)
CO2 SERPL-SCNC: 22 MMOL/L (ref 23–29)
CREAT SERPL-MCNC: 1 MG/DL (ref 0.5–1.4)
CREAT UR-MCNC: 133 MG/DL (ref 23–375)
EST. GFR  (NO RACE VARIABLE): >60 ML/MIN/1.73 M^2
ESTIMATED AVG GLUCOSE: 171 MG/DL (ref 68–131)
GLUCOSE SERPL-MCNC: 150 MG/DL (ref 70–110)
HBA1C MFR BLD: 7.6 % (ref 4–5.6)
HDLC SERPL-MCNC: 48 MG/DL (ref 40–75)
HDLC SERPL: 25 % (ref 20–50)
LDLC SERPL CALC-MCNC: 132.6 MG/DL (ref 63–159)
MICROALBUMIN UR DL<=1MG/L-MCNC: <5 UG/ML
NONHDLC SERPL-MCNC: 144 MG/DL
POTASSIUM SERPL-SCNC: 4.6 MMOL/L (ref 3.5–5.1)
PROT SERPL-MCNC: 8.2 G/DL (ref 6–8.4)
SODIUM SERPL-SCNC: 137 MMOL/L (ref 136–145)
TRIGL SERPL-MCNC: 57 MG/DL (ref 30–150)
TSH SERPL DL<=0.005 MIU/L-ACNC: 1.15 UIU/ML (ref 0.4–4)

## 2024-01-10 PROCEDURE — 80061 LIPID PANEL: CPT | Performed by: INTERNAL MEDICINE

## 2024-01-10 PROCEDURE — 80053 COMPREHEN METABOLIC PANEL: CPT | Performed by: INTERNAL MEDICINE

## 2024-01-10 PROCEDURE — 36415 COLL VENOUS BLD VENIPUNCTURE: CPT | Performed by: INTERNAL MEDICINE

## 2024-01-10 PROCEDURE — 84443 ASSAY THYROID STIM HORMONE: CPT | Performed by: INTERNAL MEDICINE

## 2024-01-10 PROCEDURE — 82043 UR ALBUMIN QUANTITATIVE: CPT | Performed by: INTERNAL MEDICINE

## 2024-01-10 PROCEDURE — 83036 HEMOGLOBIN GLYCOSYLATED A1C: CPT | Performed by: INTERNAL MEDICINE

## 2024-01-12 ENCOUNTER — OFFICE VISIT (OUTPATIENT)
Dept: ENDOCRINOLOGY | Facility: CLINIC | Age: 24
End: 2024-01-12
Payer: COMMERCIAL

## 2024-01-12 VITALS
WEIGHT: 190.06 LBS | DIASTOLIC BLOOD PRESSURE: 98 MMHG | BODY MASS INDEX: 30.54 KG/M2 | SYSTOLIC BLOOD PRESSURE: 152 MMHG | HEART RATE: 98 BPM | OXYGEN SATURATION: 99 % | HEIGHT: 66 IN

## 2024-01-12 DIAGNOSIS — E78.5 HYPERLIPIDEMIA, UNSPECIFIED HYPERLIPIDEMIA TYPE: ICD-10-CM

## 2024-01-12 DIAGNOSIS — E10.65 TYPE 1 DIABETES MELLITUS WITH HYPERGLYCEMIA: Primary | ICD-10-CM

## 2024-01-12 DIAGNOSIS — Z96.41 INSULIN PUMP STATUS: ICD-10-CM

## 2024-01-12 DIAGNOSIS — E10.9 TYPE 1 DIABETES MELLITUS WITHOUT COMPLICATION: ICD-10-CM

## 2024-01-12 PROCEDURE — 3008F BODY MASS INDEX DOCD: CPT | Mod: CPTII,S$GLB,, | Performed by: INTERNAL MEDICINE

## 2024-01-12 PROCEDURE — 3066F NEPHROPATHY DOC TX: CPT | Mod: CPTII,S$GLB,, | Performed by: INTERNAL MEDICINE

## 2024-01-12 PROCEDURE — 3077F SYST BP >= 140 MM HG: CPT | Mod: CPTII,S$GLB,, | Performed by: INTERNAL MEDICINE

## 2024-01-12 PROCEDURE — 99214 OFFICE O/P EST MOD 30 MIN: CPT | Mod: 25,S$GLB,, | Performed by: INTERNAL MEDICINE

## 2024-01-12 PROCEDURE — 1160F RVW MEDS BY RX/DR IN RCRD: CPT | Mod: CPTII,S$GLB,, | Performed by: INTERNAL MEDICINE

## 2024-01-12 PROCEDURE — 3080F DIAST BP >= 90 MM HG: CPT | Mod: CPTII,S$GLB,, | Performed by: INTERNAL MEDICINE

## 2024-01-12 PROCEDURE — 3051F HG A1C>EQUAL 7.0%<8.0%: CPT | Mod: CPTII,S$GLB,, | Performed by: INTERNAL MEDICINE

## 2024-01-12 PROCEDURE — 95251 CONT GLUC MNTR ANALYSIS I&R: CPT | Mod: S$GLB,,, | Performed by: INTERNAL MEDICINE

## 2024-01-12 PROCEDURE — 99999 PR PBB SHADOW E&M-EST. PATIENT-LVL IV: CPT | Mod: PBBFAC,,,

## 2024-01-12 PROCEDURE — 1159F MED LIST DOCD IN RCRD: CPT | Mod: CPTII,S$GLB,, | Performed by: INTERNAL MEDICINE

## 2024-01-12 PROCEDURE — 3061F NEG MICROALBUMINURIA REV: CPT | Mod: CPTII,S$GLB,, | Performed by: INTERNAL MEDICINE

## 2024-01-12 NOTE — PROGRESS NOTES
Cindy Garcia is a 23 y.o. male  presenting for follow-up of  T1DM          History of Present Illness  T1DM  Diagnosed at age 5  Known complications: none    Doing well since last visit  Has graduated from college but planning to go to vet school, applying soon    Notes higher sugars recently  Less activity with not being in school      Current Diabetes Regimen:  Tandem TSlim with Control IQ, humalog  Midnight 1.000 u/hr 1u:35 mg/dL 1u:7.0 g 110 mg/dL  6:00 AM 1.300 u/hr 1u:30 mg/dL 1u:5.0 g 110 mg/dL  11:00 AM 1.100 u/hr 1u:30 mg/dL 1u:6.0 g 110 mg/dL  5:00 PM 0.900 u/hr 1u:30 mg/dL 1u:7.0 g 110 mg/dL    Timing of prandial insulin:   Omitted doses: denies    Backup basal: Tresiba  Glucagon: baqsimi      Last Hgb A1C:  Lab Results   Component Value Date    HGBA1C 7.6 (H) 01/10/2024       Glucose Monitoring:  Meter/CGM: Dexcom G6. On pump report        Hypoglycemic Episodes: see above    DKA: last when he was in middle school, none recent    Screening / DM Complications:    Nephropathy:  ACEi/ARB: Not taking  Lab Results   Component Value Date    MICALBCREAT Unable to calculate 01/10/2024       Last Lipid Panel:  Statin: Not taking  Lab Results   Component Value Date    LDLCALC 132.6 01/10/2024       Last foot exam : 08/01/2022  Last eye exam : 11/16/2023;  no laser surgery or DR    Lab Results   Component Value Date    TSH 1.152 01/10/2024       Lab Results   Component Value Date    TTGIGA 4 03/04/2019       Current Outpatient Medications:     blood sugar diagnostic (ONETOUCH VERIO) Strp, Use for blood glucose testing 8 x daily;before meals,snack,bed and prn symptoms of hypo/hyperglycemia, Disp: 600 each, Rfl: 3    blood-glucose sensor (DEXCOM G6 SENSOR) Lucy, 1 Device by Misc.(Non-Drug; Combo Route) route every 10 days. Use with Dexcom transmitter for continuous glucose monitoring, Disp: 9 each, Rfl: 3    blood-glucose transmitter (DEXCOM G6 TRANSMITTER) Lucy, 1 Device by Misc.(Non-Drug; Combo Route) route every  "3 (three) months. Use with dexcom sensor for continuous glucose monitoring, Disp: 1 each, Rfl: 3    FREESTYLE LANCETS 28 gauge lancets, 1 lancet by Misc.(Non-Drug; Combo Route) route Daily. Up to 8 times a day, Disp: 750 each, Rfl: 2    glucagon (BAQSIMI) 3 mg/actuation Spry, 1 spray by Nasal route as needed (hypoglycemia)., Disp: 2 each, Rfl: 2    insulin lispro (HUMALOG U-100 INSULIN) 100 unit/mL injection, PLACE 200 UNITS IN PUMP EVERY TWO DAYS. 3 months supply, Disp: 90 mL, Rfl: 3    insulin syringe-needle U-100 1 mL 31 x 5/16" Syrg, Use as needed for insulin 4 times/day, Disp: 50 each, Rfl: 3    urine glucose-ketones test (KETO-DIASTIX) Strp, Check ketones if blood sugar > 300. 3 months supply, Disp: 300 strip, Rfl: 2    insulin degludec (TRESIBA FLEXTOUCH U-100) 100 unit/mL (3 mL) insulin pen, To use in instance of pump failure, take 32 units daily (Patient not taking: Reported on 1/12/2024), Disp: , Rfl:     pen needle, diabetic (BD ULTRA-FINE DESHAWN PEN NEEDLE) 32 gauge x 5/32" Ndle, 1 each by Misc.(Non-Drug; Combo Route) route once daily. (Patient not taking: Reported on 1/12/2024), Disp: 30 each, Rfl: 3    ROS as above    Objective:     Vitals:    01/12/24 1532   BP: (!) 152/98   Pulse: 98     Wt Readings from Last 3 Encounters:   01/12/24 1532 86.2 kg (190 lb 0.6 oz)   08/01/22 1524 82.7 kg (182 lb 5.1 oz)   01/03/22 1447 81.5 kg (179 lb 10.8 oz)         Body mass index is 30.91 kg/m².        Labs    Chemistry        Component Value Date/Time     01/10/2024 0835    K 4.6 01/10/2024 0835     01/10/2024 0835    CO2 22 (L) 01/10/2024 0835    BUN 9 01/10/2024 0835    CREATININE 1.0 01/10/2024 0835     (H) 01/10/2024 0835        Component Value Date/Time    CALCIUM 10.0 01/10/2024 0835    ALKPHOS 77 01/10/2024 0835    AST 30 01/10/2024 0835    ALT 44 01/10/2024 0835    BILITOT 0.8 01/10/2024 0835    ESTGFRAFRICA >60 06/18/2013 1223    EGFRNONAA  10/07/2013 1803      Comment:      Calculation " used to obtain the estimated glomerular filtration  rate (eGFR) is the CKD-EPI equation. Since race is unknown   in our information system, the eGFR values for   -American and Non--American patients are given   for each creatinine result.              Assessment and Plan     Type 1 diabetes mellitus without complication  A1c higher than usual levels. Notes less exercise and perhaps diet less consistent over holidays  Does seem to have some increasing insulin resistance  Strong family history of T2DM and discussed this as risk factor for resistance  Will make changes to settings as below  Resume more regular exercise    Plans to upgrade to G7 once available in pharmacy    Patient Instructions   11 AM basal rate 1.1 ISF 25 carb ratio 5  5 PM basal rate 1.1 ISF 25 25 carb ratio 6    Pump backup plan     If the insulin pump is non functional and discontinued for anticipated more than 20 hours, please give daily injections of:  Long acting insulin:Tresiba 32 units daily  Short acting insulin:  humalog with carb ratio of 1 unit per 6 grams and correction 1 unit per 30 points above 150    that the local pharmacies have the correct sensors yet. They may be going to supply companies first. I put a picture below of what you will need to look for. The last line of letters/numbers will be underlined for the sensors that are compatible with pumps (no underline if the sensor is not compatible).    Dexcom also has a program where if you are given incorrect ones you can request a change at this website [email or web address]. But I would assume that will take a little time for shipping so just be aware of that              Insulin pump status  As above    HLD (hyperlipidemia)  LDL higher than previous checks  Recommend dietary modification            RTC 4 months           Penelope Sol MD

## 2024-01-12 NOTE — ASSESSMENT & PLAN NOTE
A1c higher than usual levels. Notes less exercise and perhaps diet less consistent over holidays  Does seem to have some increasing insulin resistance  Strong family history of T2DM and discussed this as risk factor for resistance  Will make changes to settings as below  Resume more regular exercise    Plans to upgrade to G7 once available in pharmacy    Patient Instructions   11 AM basal rate 1.1 ISF 25 carb ratio 5  5 PM basal rate 1.1 ISF 25 25 carb ratio 6    Pump backup plan     If the insulin pump is non functional and discontinued for anticipated more than 20 hours, please give daily injections of:  Long acting insulin:Tresiba 32 units daily  Short acting insulin:  humalog with carb ratio of 1 unit per 6 grams and correction 1 unit per 30 points above 150    that the local pharmacies have the correct sensors yet. They may be going to supply companies first. I put a picture below of what you will need to look for. The last line of letters/numbers will be underlined for the sensors that are compatible with pumps (no underline if the sensor is not compatible).    Dexcom also has a program where if you are given incorrect ones you can request a change at this website [email or web address]. But I would assume that will take a little time for shipping so just be aware of that

## 2024-01-12 NOTE — PATIENT INSTRUCTIONS
11 AM basal rate 1.1 ISF 25 carb ratio 5  5 PM basal rate 1.1 ISF 25 25 carb ratio 6    Pump backup plan     If the insulin pump is non functional and discontinued for anticipated more than 20 hours, please give daily injections of:  Long acting insulin:Tresiba 32 units daily  Short acting insulin:  humalog with carb ratio of 1 unit per 6 grams and correction 1 unit per 30 points above 150    that the local pharmacies have the correct sensors yet. They may be going to supply companies first. I put a picture below of what you will need to look for. The last line of letters/numbers will be underlined for the sensors that are compatible with pumps (no underline if the sensor is not compatible).    Dexcom also has a program where if you are given incorrect ones you can request a change at this website [email or web address]. But I would assume that will take a little time for shipping so just be aware of that

## 2024-01-23 ENCOUNTER — PATIENT MESSAGE (OUTPATIENT)
Dept: ENDOCRINOLOGY | Facility: CLINIC | Age: 24
End: 2024-01-23
Payer: COMMERCIAL

## 2024-01-23 DIAGNOSIS — Z96.41 INSULIN PUMP STATUS: ICD-10-CM

## 2024-01-23 DIAGNOSIS — E10.65 TYPE 1 DIABETES MELLITUS WITH HYPERGLYCEMIA: Primary | ICD-10-CM

## 2024-01-23 RX ORDER — BLOOD-GLUCOSE SENSOR
1 EACH MISCELLANEOUS
Qty: 3 EACH | Refills: 11 | Status: SHIPPED | OUTPATIENT
Start: 2024-01-23 | End: 2025-01-22

## 2024-01-23 NOTE — TELEPHONE ENCOUNTER
Lov 1/2024    I put a note in there about dispensing the one the goes with pump. Hopefully they see it.

## 2024-02-27 DIAGNOSIS — E10.65 TYPE 1 DIABETES MELLITUS WITH HYPERGLYCEMIA: ICD-10-CM

## 2024-02-27 RX ORDER — INSULIN LISPRO 100 [IU]/ML
INJECTION, SOLUTION INTRAVENOUS; SUBCUTANEOUS
Qty: 90 ML | Refills: 3 | Status: SHIPPED | OUTPATIENT
Start: 2024-02-27

## 2024-05-16 ENCOUNTER — LAB VISIT (OUTPATIENT)
Dept: LAB | Facility: HOSPITAL | Age: 24
End: 2024-05-16
Attending: INTERNAL MEDICINE
Payer: COMMERCIAL

## 2024-05-16 DIAGNOSIS — E10.65 TYPE 1 DIABETES MELLITUS WITH HYPERGLYCEMIA: ICD-10-CM

## 2024-05-16 PROCEDURE — 83036 HEMOGLOBIN GLYCOSYLATED A1C: CPT | Performed by: INTERNAL MEDICINE

## 2024-05-16 PROCEDURE — 36415 COLL VENOUS BLD VENIPUNCTURE: CPT | Performed by: INTERNAL MEDICINE

## 2024-05-17 LAB
ESTIMATED AVG GLUCOSE: 143 MG/DL (ref 68–131)
HBA1C MFR BLD: 6.6 % (ref 4–5.6)

## 2024-05-20 ENCOUNTER — TELEPHONE (OUTPATIENT)
Dept: ENDOCRINOLOGY | Facility: CLINIC | Age: 24
End: 2024-05-20
Payer: COMMERCIAL

## 2024-05-20 ENCOUNTER — OFFICE VISIT (OUTPATIENT)
Dept: ENDOCRINOLOGY | Facility: CLINIC | Age: 24
End: 2024-05-20
Payer: COMMERCIAL

## 2024-05-20 ENCOUNTER — PATIENT MESSAGE (OUTPATIENT)
Dept: ENDOCRINOLOGY | Facility: CLINIC | Age: 24
End: 2024-05-20

## 2024-05-20 VITALS
DIASTOLIC BLOOD PRESSURE: 80 MMHG | SYSTOLIC BLOOD PRESSURE: 135 MMHG | WEIGHT: 189.94 LBS | HEART RATE: 97 BPM | OXYGEN SATURATION: 99 % | BODY MASS INDEX: 31.65 KG/M2 | HEIGHT: 65 IN

## 2024-05-20 DIAGNOSIS — Z96.41 INSULIN PUMP STATUS: ICD-10-CM

## 2024-05-20 DIAGNOSIS — E10.9 TYPE 1 DIABETES MELLITUS WITHOUT COMPLICATION: Primary | ICD-10-CM

## 2024-05-20 PROCEDURE — 1160F RVW MEDS BY RX/DR IN RCRD: CPT | Mod: CPTII,S$GLB,, | Performed by: INTERNAL MEDICINE

## 2024-05-20 PROCEDURE — 95251 CONT GLUC MNTR ANALYSIS I&R: CPT | Mod: S$GLB,,, | Performed by: INTERNAL MEDICINE

## 2024-05-20 PROCEDURE — 3079F DIAST BP 80-89 MM HG: CPT | Mod: CPTII,S$GLB,, | Performed by: INTERNAL MEDICINE

## 2024-05-20 PROCEDURE — 3044F HG A1C LEVEL LT 7.0%: CPT | Mod: CPTII,S$GLB,, | Performed by: INTERNAL MEDICINE

## 2024-05-20 PROCEDURE — 3008F BODY MASS INDEX DOCD: CPT | Mod: CPTII,S$GLB,, | Performed by: INTERNAL MEDICINE

## 2024-05-20 PROCEDURE — 1159F MED LIST DOCD IN RCRD: CPT | Mod: CPTII,S$GLB,, | Performed by: INTERNAL MEDICINE

## 2024-05-20 PROCEDURE — 3075F SYST BP GE 130 - 139MM HG: CPT | Mod: CPTII,S$GLB,, | Performed by: INTERNAL MEDICINE

## 2024-05-20 PROCEDURE — 3066F NEPHROPATHY DOC TX: CPT | Mod: CPTII,S$GLB,, | Performed by: INTERNAL MEDICINE

## 2024-05-20 PROCEDURE — 3061F NEG MICROALBUMINURIA REV: CPT | Mod: CPTII,S$GLB,, | Performed by: INTERNAL MEDICINE

## 2024-05-20 PROCEDURE — 99999 PR PBB SHADOW E&M-EST. PATIENT-LVL IV: CPT | Mod: PBBFAC,,,

## 2024-05-20 PROCEDURE — 99214 OFFICE O/P EST MOD 30 MIN: CPT | Mod: S$GLB,,, | Performed by: INTERNAL MEDICINE

## 2024-05-20 NOTE — PATIENT INSTRUCTIONS
Try to turn on exercise mode 60-90 minutes before work  If you still get low, we may need a separate profile    Try to bolus 15-20 minutes before meals    Midnight 1.000 u/hr 1u:35 mg/dL 1u:7.0 g 110 mg/dL  6:00 AM 1.300 u/hr 1u:30 mg/dL 1u:6.0 g 110 mg/dL  9:00 AM 1.100 u/hr 1u:30 mg/dL 1u:6.0 g 110 mg/dL  11:00 AM 1.100 u/hr 1u:30 mg/dL 1u:6.0 g 110 mg/dL  5:00 PM 0.900 u/hr 1u:30 mg/dL 1u:5.0 g 110 mg/dL

## 2024-05-20 NOTE — PROGRESS NOTES
Cindy Garcia is a 23 y.o. male  presenting for follow-up of  T1DM    History of Present Illness  T1DM  Diagnosed at age 5  Known complications: none    New job working Mon-Thurs and some weekends, 2 Saturdays a month  Using exercise mode sometimes at work but still finds he has a lot of lows  Dinner is often later and notes more highs    Frequent occlusions in pump sites which are frustrating. Using abdomen and thighs mostly. Does rotate sites       Current Diabetes Regimen:  Tandem TSlim with Control IQ, humalog  Midnight 1.000 u/hr 1u:35 mg/dL 1u:7.0 g 110 mg/dL  6:00 AM 1.300 u/hr 1u:30 mg/dL 1u:5.0 g 110 mg/dL  9:00 AM 1.100 u/hr 1u:30 mg/dL 1u:6.0 g 110 mg/dL  11:00 AM 1.100 u/hr 1u:30 mg/dL 1u:6.0 g 110 mg/dL  5:00 PM 0.900 u/hr 1u:30 mg/dL 1u:7.0 g 110 mg/dL    Timing of prandial insulin:   Omitted doses: denies    Backup basal: Tresiba  Glucagon: baqsimi      Last Hgb A1C:  Lab Results   Component Value Date    HGBA1C 6.6 (H) 05/16/2024       Glucose Monitoring:  Meter/CGM: Dexcom G6. On pump report        Hypoglycemic Episodes: see above    DKA: last when he was in middle school, none recent    Screening / DM Complications:    Nephropathy:  ACEi/ARB: Not taking  Lab Results   Component Value Date    MICALBCREAT Unable to calculate 01/10/2024       Last Lipid Panel:  Statin: Not taking  Lab Results   Component Value Date    LDLCALC 132.6 01/10/2024       Last foot exam : 08/01/2022  Last eye exam : 11/16/2023;  no laser surgery or DR    Lab Results   Component Value Date    TSH 1.152 01/10/2024       Lab Results   Component Value Date    TTGIGA 4 03/04/2019       Current Outpatient Medications:     blood-glucose sensor (DEXCOM G6 SENSOR) Lucy, 1 Device by Misc.(Non-Drug; Combo Route) route every 10 days. Use with Dexcom transmitter for continuous glucose monitoring, Disp: 9 each, Rfl: 3    blood-glucose sensor (DEXCOM G7 SENSOR) Lucy, 1 Device by Misc.(Non-Drug; Combo Route) route every 10 days., Disp: 3  "each, Rfl: 11    blood-glucose transmitter (DEXCOM G6 TRANSMITTER) Lucy, 1 Device by Misc.(Non-Drug; Combo Route) route every 3 (three) months. Use with dexcom sensor for continuous glucose monitoring, Disp: 1 each, Rfl: 3    glucagon (BAQSIMI) 3 mg/actuation Spry, 1 spray by Nasal route as needed (hypoglycemia)., Disp: 2 each, Rfl: 2    insulin degludec (TRESIBA FLEXTOUCH U-100) 100 unit/mL (3 mL) insulin pen, To use in instance of pump failure, take 32 units daily, Disp: , Rfl:     insulin lispro (HUMALOG U-100 INSULIN) 100 unit/mL injection, Inject 100 units daily via insulin pump, Disp: 90 mL, Rfl: 3    insulin syringe-needle U-100 1 mL 31 x 5/16" Syrg, Use as needed for insulin 4 times/day, Disp: 50 each, Rfl: 3    pen needle, diabetic (BD ULTRA-FINE DESHAWN PEN NEEDLE) 32 gauge x 5/32" Ndle, 1 each by Misc.(Non-Drug; Combo Route) route once daily., Disp: 30 each, Rfl: 3    urine glucose-ketones test (KETO-DIASTIX) Strp, Check ketones if blood sugar > 300. 3 months supply, Disp: 300 strip, Rfl: 2    blood sugar diagnostic (ONETOUCH VERIO) Strp, Use for blood glucose testing 8 x daily;before meals,snack,bed and prn symptoms of hypo/hyperglycemia (Patient not taking: Reported on 5/20/2024), Disp: 600 each, Rfl: 3    FREESTYLE LANCETS 28 gauge lancets, 1 lancet by Misc.(Non-Drug; Combo Route) route Daily. Up to 8 times a day (Patient not taking: Reported on 5/20/2024), Disp: 750 each, Rfl: 2    ROS as above    Objective:     Vitals:    05/20/24 1542   BP: 135/80   Pulse: 97       Wt Readings from Last 3 Encounters:   05/20/24 1542 86.1 kg (189 lb 14.8 oz)   01/12/24 1532 86.2 kg (190 lb 0.6 oz)   08/01/22 1524 82.7 kg (182 lb 5.1 oz)         Body mass index is 31.61 kg/m².  Pump site LLQ  No obvious lipohypertrophy over abdomen      Labs    Chemistry        Component Value Date/Time     01/10/2024 0835    K 4.6 01/10/2024 0835     01/10/2024 0835    CO2 22 (L) 01/10/2024 0835    BUN 9 01/10/2024 0835    " CREATININE 1.0 01/10/2024 0835     (H) 01/10/2024 0835        Component Value Date/Time    CALCIUM 10.0 01/10/2024 0835    ALKPHOS 77 01/10/2024 0835    AST 30 01/10/2024 0835    ALT 44 01/10/2024 0835    BILITOT 0.8 01/10/2024 0835    ESTGFRAFRICA >60 06/18/2013 1223    EGFRNONAA  10/07/2013 1803      Comment:      Calculation used to obtain the estimated glomerular filtration  rate (eGFR) is the CKD-EPI equation. Since race is unknown   in our information system, the eGFR values for   -American and Non--American patients are given   for each creatinine result.              Assessment and Plan     Type 1 diabetes mellitus without complication  Pump and sensor download reviewed and with significant variability. Consistently with highs after lunch and dinner, some lows with breakfast when boluses on time  Timing of mealtime bolus is likely contributing to fluctuations  A1c at goal although GMI 7.2%    New pump settings:  Midnight 1.000 u/hr 1u:35 mg/dL 1u:7.0 g 110 mg/dL  6:00 AM 1.300 u/hr 1u:30 mg/dL 1u:6.0 g 110 mg/dL  9:00 AM 1.100 u/hr 1u:30 mg/dL 1u:6.0 g 110 mg/dL  11:00 AM 1.100 u/hr 1u:30 mg/dL 1u:5.0 g 110 mg/dL  5:00 PM 0.900 u/hr 1u:30 mg/dL 1u:5.0 g 110 mg/dL    Discussed possible change to steel cannula but he prefers to work on rotating sites more before moving to this    Insulin pump status  As above      25 minutes of total time spent on the encounter, which includes face to face time and non-face to face time preparing to see the patient (eg, review of tests), obtaining and/or reviewing separately obtained history, documenting clinical information in the electronic or other health record, independently interpreting results (not separately reported) and communicating results to the patient/family/caregiver, or care coordination (not separately reported). This does not include time spent for review of continuous glucose monitor data                RTC 4 months           Penelope DE LOS SANTOS  MD [place]

## 2024-05-20 NOTE — TELEPHONE ENCOUNTER
----- Message from Kalpana Hernandez sent at 5/20/2024  2:09 PM CDT -----  Regarding: Appt  Contact: Pt [phone]  Pt is calling to speak to nurse about appt that is scheduled today 3:30 please call

## 2024-05-20 NOTE — ASSESSMENT & PLAN NOTE
Pump and sensor download reviewed and with significant variability. Consistently with highs after lunch and dinner, some lows with breakfast when boluses on time  Timing of mealtime bolus is likely contributing to fluctuations  A1c at goal although GMI 7.2%    New pump settings:  Midnight 1.000 u/hr 1u:35 mg/dL 1u:7.0 g 110 mg/dL  6:00 AM 1.300 u/hr 1u:30 mg/dL 1u:6.0 g 110 mg/dL  9:00 AM 1.100 u/hr 1u:30 mg/dL 1u:6.0 g 110 mg/dL  11:00 AM 1.100 u/hr 1u:30 mg/dL 1u:5.0 g 110 mg/dL  5:00 PM 0.900 u/hr 1u:30 mg/dL 1u:5.0 g 110 mg/dL    Discussed possible change to steel cannula but he prefers to work on rotating sites more before moving to this

## 2024-05-20 NOTE — TELEPHONE ENCOUNTER
Spke with pt, pt expresses he does not wish to have his parent in the room with him for the visit today. Assured pt that we will make sure pt comes back to the room by himself

## 2024-08-07 ENCOUNTER — TELEPHONE (OUTPATIENT)
Dept: FAMILY MEDICINE | Facility: CLINIC | Age: 24
End: 2024-08-07
Payer: COMMERCIAL

## 2024-08-12 DIAGNOSIS — E10.9 TYPE 1 DIABETES MELLITUS WITHOUT COMPLICATION: Primary | ICD-10-CM

## 2024-08-12 RX ORDER — INSULIN DEGLUDEC 100 U/ML
INJECTION, SOLUTION SUBCUTANEOUS
Status: CANCELLED
Start: 2024-08-12

## 2024-08-12 RX ORDER — INSULIN DEGLUDEC 100 U/ML
32 INJECTION, SOLUTION SUBCUTANEOUS DAILY
Qty: 15 ML | Refills: 11 | Status: SHIPPED | OUTPATIENT
Start: 2024-08-12 | End: 2025-08-12

## 2024-09-23 ENCOUNTER — TELEPHONE (OUTPATIENT)
Dept: ENDOCRINOLOGY | Facility: CLINIC | Age: 24
End: 2024-09-23
Payer: COMMERCIAL

## 2024-09-23 NOTE — TELEPHONE ENCOUNTER
----- Message from Marcial Echeverria sent at 9/23/2024 10:17 AM CDT -----  Regarding: Appt  Contact: [phone]  Who call ? Cindy Garcia     What is the request Details : Pt calling to speak with someone in provider office regards r/s appt on 9/26 to virtual. No appt available.         Can clinic  use patient portal  : Yes     What number to call back : [phone]

## 2024-09-26 ENCOUNTER — OFFICE VISIT (OUTPATIENT)
Dept: ENDOCRINOLOGY | Facility: CLINIC | Age: 24
End: 2024-09-26
Payer: COMMERCIAL

## 2024-09-26 VITALS — BODY MASS INDEX: 31.49 KG/M2 | WEIGHT: 189 LBS | HEIGHT: 65 IN

## 2024-09-26 DIAGNOSIS — E10.9 TYPE 1 DIABETES MELLITUS WITHOUT COMPLICATION: Primary | ICD-10-CM

## 2024-09-26 DIAGNOSIS — Z96.41 INSULIN PUMP STATUS: ICD-10-CM

## 2024-09-26 DIAGNOSIS — E78.5 HYPERLIPIDEMIA, UNSPECIFIED HYPERLIPIDEMIA TYPE: ICD-10-CM

## 2024-09-26 NOTE — ASSESSMENT & PLAN NOTE
Pump and sensor download reviewed. Consistently with highs after lunch and dinner, likely d/t underestimating carbs. Discussed w/ patient that he hasn't had lows and should work on entering appropriate number for carbs, and if this is too strong he can contact us and we can help make changes.  GMI 7.3%    No changes made to pump settings today other than starting sleep mode later at midnight to help w/ nighttime highs.    DM maintenance topics addressed in HPI  Patient does have long-acting insulin and glucagon

## 2024-09-26 NOTE — PROGRESS NOTES
Cindy Garcia is a 23 y.o. male  presenting for follow-up of  T1DM    History of Present Illness  T1DM  Diagnosed at age 5  Known complications: none    At the last visit in May 2025, we increased ICR for lunch and dinner and weakened it in the AM.    Stil dealing w/ occlusions in pump sites which are frustrating. Using abdomen and thighs mostly. Does rotate sites appropriately, not interested in [employer].      Current Diabetes Regimen:  Tandem TSlim with Control IQ, Humalog  Last set of changes made in bold  Midnight 1.000 u/hr 1u:35 mg/dL 1u:7.0 g 110 mg/dL  6:00 AM 1.300 u/hr 1u:30 mg/dL 1u:6.0 g 110 mg/dL  9:00 AM 1.100 u/hr 1u:30 mg/dL 1u:6.0 g 110 mg/dL  11:00 AM 1.100 u/hr 1u:30 mg/dL 1u:5.0 g 110 mg/dL  5:00 PM 0.900 u/hr 1u:30 mg/dL 1u:5.0 g 110 mg/dL    Backup basal: Tresiba  Glucagon: baqsimi      Last Hgb A1C:  Lab Results   Component Value Date    HGBA1C 6.6 (H) 05/16/2024       Glucose Monitoring:  Meter/CGM: Dexcom G6. On pump report      TIR stable from previous values, slightly below goal (70%).  He is putting in carbs several times a day but still has postprandial high Bgs.    Breakfast and Lunch - underestimates carbs bc he's afraid of lows  Dinner - usually puts accurate amounts of carbs - eats chicken, steak, pasta, vegs    Hypoglycemic Episodes: none recently    DKA: last when he was in middle school, none recent    Screening / DM Complications:    Nephropathy:  ACEi/ARB: Not taking  Lab Results   Component Value Date    MICALBCREAT Unable to calculate 01/10/2024       Last Lipid Panel:  Statin: Not taking  Lab Results   Component Value Date    LDLCALC 132.6 01/10/2024       Last foot exam : 08/01/2022  Last eye exam : 11/16/2023;  no laser surgery or DR    Lab Results   Component Value Date    TSH 1.152 01/10/2024       Lab Results   Component Value Date    TTGIGA 4 03/04/2019       ROS as above    Objective:     No vitals or physical exam performed -  virtual visit        Assessment and  Plan     Type 1 diabetes mellitus without complication  Pump and sensor download reviewed. Consistently with highs after lunch and dinner, likely d/t underestimating carbs. Discussed w/ patient that he hasn't had lows and should work on entering appropriate number for carbs, and if this is too strong he can contact us and we can help make changes.  GMI 7.3%    No changes made to pump settings today other than starting sleep mode later at midnight to help w/ nighttime highs.    DM maintenance topics addressed in HPI  Patient does have long-acting insulin and glucagon      Insulin pump status    Tandem TSlim with Control IQ, Humalog    Midnight 1.000 u/hr 1u:35 mg/dL 1u:7.0 g 110 mg/dL  6:00 AM 1.300 u/hr 1u:30 mg/dL 1u:6.0 g 110 mg/dL  9:00 AM 1.100 u/hr 1u:30 mg/dL 1u:6.0 g 110 mg/dL  11:00 AM 1.100 u/hr 1u:30 mg/dL 1u:5.0 g 110 mg/dL  5:00 PM 0.900 u/hr 1u:30 mg/dL 1u:5.0 g 110 mg/dL    HLD (hyperlipidemia)  Recommend dietary changes, yearly recheck      25 minutes of total time spent on the encounter, which includes face to face time and non-face to face time preparing to see the patient (eg, review of tests), obtaining and/or reviewing separately obtained history, documenting clinical information in the electronic or other health record, independently interpreting results (not separately reported) and communicating results to the patient/family/caregiver, or care coordination (not separately reported). This does not include time spent for review of continuous glucose monitor data      RTC 6 months     Jazzy Bustamante MD

## 2024-09-26 NOTE — ASSESSMENT & PLAN NOTE
Tandem TSlim with Control IQ, Humalog    Midnight 1.000 u/hr 1u:35 mg/dL 1u:7.0 g 110 mg/dL  6:00 AM 1.300 u/hr 1u:30 mg/dL 1u:6.0 g 110 mg/dL  9:00 AM 1.100 u/hr 1u:30 mg/dL 1u:6.0 g 110 mg/dL  11:00 AM 1.100 u/hr 1u:30 mg/dL 1u:5.0 g 110 mg/dL  5:00 PM 0.900 u/hr 1u:30 mg/dL 1u:5.0 g 110 mg/dL

## 2024-09-27 NOTE — PROGRESS NOTES
I have reviewed and concur with Dr. Murrell's history, physical, assessment, and plan.  I have personally interviewed and examined the patient.      Penelope Sol MD

## 2025-01-27 DIAGNOSIS — Z96.41 INSULIN PUMP STATUS: ICD-10-CM

## 2025-01-27 DIAGNOSIS — E10.65 TYPE 1 DIABETES MELLITUS WITH HYPERGLYCEMIA: ICD-10-CM

## 2025-01-27 RX ORDER — BLOOD-GLUCOSE SENSOR
1 EACH MISCELLANEOUS
Qty: 3 EACH | Refills: 11 | Status: SHIPPED | OUTPATIENT
Start: 2025-01-27 | End: 2026-01-27

## 2025-02-04 DIAGNOSIS — E10.65 TYPE 1 DIABETES MELLITUS WITH HYPERGLYCEMIA: ICD-10-CM

## 2025-02-04 RX ORDER — INSULIN LISPRO 100 [IU]/ML
INJECTION, SOLUTION INTRAVENOUS; SUBCUTANEOUS
Qty: 90 ML | Refills: 3 | Status: SHIPPED | OUTPATIENT
Start: 2025-02-04

## 2025-03-26 ENCOUNTER — LAB VISIT (OUTPATIENT)
Dept: LAB | Facility: HOSPITAL | Age: 25
End: 2025-03-26
Attending: STUDENT IN AN ORGANIZED HEALTH CARE EDUCATION/TRAINING PROGRAM
Payer: COMMERCIAL

## 2025-03-26 DIAGNOSIS — E10.9 TYPE 1 DIABETES MELLITUS WITHOUT COMPLICATION: ICD-10-CM

## 2025-03-26 LAB
CHOLEST SERPL-MCNC: 175 MG/DL (ref 120–199)
CHOLEST/HDLC SERPL: 3.7 {RATIO} (ref 2–5)
EAG (OHS): 166 MG/DL (ref 68–131)
HBA1C MFR BLD: 7.4 % (ref 4–5.6)
HDLC SERPL-MCNC: 47 MG/DL (ref 40–75)
HDLC SERPL: 26.9 % (ref 20–50)
LDLC SERPL CALC-MCNC: 109.6 MG/DL (ref 63–159)
NONHDLC SERPL-MCNC: 128 MG/DL
TRIGL SERPL-MCNC: 92 MG/DL (ref 30–150)

## 2025-03-26 PROCEDURE — 36415 COLL VENOUS BLD VENIPUNCTURE: CPT

## 2025-03-26 PROCEDURE — 80061 LIPID PANEL: CPT

## 2025-03-26 PROCEDURE — 83036 HEMOGLOBIN GLYCOSYLATED A1C: CPT

## 2025-03-27 ENCOUNTER — OFFICE VISIT (OUTPATIENT)
Dept: ENDOCRINOLOGY | Facility: CLINIC | Age: 25
End: 2025-03-27
Payer: COMMERCIAL

## 2025-03-27 VITALS
WEIGHT: 191.81 LBS | BODY MASS INDEX: 31.96 KG/M2 | DIASTOLIC BLOOD PRESSURE: 92 MMHG | SYSTOLIC BLOOD PRESSURE: 146 MMHG | HEIGHT: 65 IN

## 2025-03-27 DIAGNOSIS — E10.9 TYPE 1 DIABETES MELLITUS WITHOUT COMPLICATION: Primary | ICD-10-CM

## 2025-03-27 DIAGNOSIS — Z96.41 INSULIN PUMP STATUS: ICD-10-CM

## 2025-03-27 DIAGNOSIS — R23.8 SKIN IRRITATION: ICD-10-CM

## 2025-03-27 PROCEDURE — 99999 PR PBB SHADOW E&M-EST. PATIENT-LVL III: CPT | Mod: PBBFAC,,,

## 2025-03-27 NOTE — PATIENT INSTRUCTIONS
"Mastisol liquid adhesive  https://www.[employer]/Mastisol-Liquid-Adhesive-2-oz/dp/[email or web address]?th=1    You may need unisolve to remove      You can also go to https://www.[employer].org/skin-solutions for more recommendations    "Home"  Midnight 1.000 u/hr 1u:35 mg/dL 1u:7.0 g 110 mg/dL  6:00 AM 1.300 u/hr 1u:30 mg/dL 1u:5.0 g 110 mg/dL  9:00 AM 1.100 u/hr 1u:30 mg/dL 1u:5.0 g 110 mg/dL  11:00 AM 1.100 u/hr 1u:20 mg/dL 1u:4.0 g 110 mg/dL  5:00 PM   1.100 u/hr 1u:20 mg/dL 1u:4.0 g 110 mg/dL     "Work"  Midnight 1.000 u/hr 1u:35 mg/dL 1u:7.0 g 110 mg/dL  6:00 AM 1.100 u/hr 1u:30 mg/dL 1u:6.0 g 110 mg/dL  9:00 AM 0.900 u/hr 1u:30 mg/dL 1u:6.0 g 110 mg/dL  11:00 AM 0.900 u/hr 1u:25 mg/dL 1u:5.0 g 110 mg/dL  5:00 PM   1.100 u/hr 1u:25 mg/dL 1u:4.0 g 110 mg/dL  "

## 2025-03-27 NOTE — ASSESSMENT & PLAN NOTE
"Patient having trouble with pump and CGM staying on skin, reports trying skin tac, flonase, and overpatch. Concerns with over patch possible causing skin irritation.    Provided patient with resources to help keep devices in place, recommended he consider Mastisol liquid adhesive.    Plan for new insulin settings with two profiles  "Home"  Midnight 1.000 u/hr 1u:35 mg/dL 1u:7.0 g 110 mg/dL  6:00 AM 1.300 u/hr 1u:30 mg/dL 1u:5.0 g 110 mg/dL  9:00 AM 1.100 u/hr 1u:30 mg/dL 1u:5.0 g 110 mg/dL  11:00 AM 1.100 u/hr 1u:20 mg/dL 1u:4.0 g 110 mg/dL  5:00 PM   1.100 u/hr 1u:20 mg/dL 1u:4.0 g 110 mg/dL     "Work"  Midnight 1.000 u/hr 1u:35 mg/dL 1u:7.0 g 110 mg/dL  6:00 AM 1.100 u/hr 1u:30 mg/dL 1u:6.0 g 110 mg/dL  9:00 AM 0.900 u/hr 1u:30 mg/dL 1u:6.0 g 110 mg/dL  11:00 AM 0.900 u/hr 1u:25 mg/dL 1u:5.0 g 110 mg/dL  5:00 PM   1.100 u/hr 1u:25 mg/dL 1u:4.0 g 110 mg/dL  "

## 2025-03-27 NOTE — PROGRESS NOTES
INSULIN PUMP CLINIC FOLLOW UP VISIT  3/27/2025    Cindy Garcia is a 24 y.o. 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  Not currently in school, planning to go to vet school in the future.  Not physically active when not working.   States he bolus for morning coffee, states about 35g of carbs.  Eats breakfast, lunch, and dinner. States he feels he drops starting at 4 pm, eats dinner at 6.  When not at work does not have much physical activity, eats three meals but does not snack much on his off days.  Snacks on random foods, does bolus for them unless he knows he will be active or if low.   States timing of bolus and carb counting can be improved.  Reports trouble with keeping insulin pump and CGM staying on, has attempted adhesives which lead to skin irritation. Using his lower extremities for areas to keep the pump and CGM.    Current Diabetes Regimen:  Tandem TSlim with Control IQ, Humalog  Midnight 1.000 u/hr 1u:35 mg/dL 1u:7.0 g 110 mg/dL  6:00 AM 1.300 u/hr 1u:30 mg/dL 1u:6.0 g 110 mg/dL  9:00 AM 1.100 u/hr 1u:30 mg/dL 1u:6.0 g 110 mg/dL  11:00 AM 1.100 u/hr 1u:25 mg/dL 1u:5.0 g 110 mg/dL  5:00 PM   1.100 u/hr 1u:25 mg/dL 1u:5.0 g 110 mg/dL    Backup basal: Tresiba  Glucagon: baqsimi      Last Hgb A1C:  Lab Results   Component Value Date    HGBA1C 7.4 (H) 03/26/2025       Glucose Monitoring:  Meter/CGM: Dexcom G6. On pump report    Interpretation: Patient is in range 63% of the time with 1.3% low and 0.7% very low with 98% active CGM time. Patient is using about 88 average daily  units of insulin with a 33% basal and 67% bolus composition. Global hyperglycemia more evident with prandial meals. Bolus timing appears to not be consistently before meals, multiple corrections during the day to balance sustained bg excursions. Some hypoglycemia on review, appears to occur with possible insulin stacking with correction from the patient, auto correct, and bolus at the same time leading to steep declines in blood glucose. Appears to use activity mode on work days consistently.      Breakfast and Lunch - underestimates carbs bc he's afraid of lows  Dinner - usually puts accurate amounts of carbs - eats chicken, steak, pasta, vegs    Hypoglycemic Episodes: notices with physical activity, states he does feel symptoms but denies any recently.    DKA: last when he was in middle school, none recent    Screening / DM Complications:    Nephropathy:  ACEi/ARB: Not taking  Lab Results   Component Value Date    MICALBCREAT Unable to calculate 01/10/2024       Last Lipid Panel:  Statin: Not taking  Lab Results   Component Value Date    LDLCALC 132.6 01/10/2024       Last foot exam 3/27/2025  Last eye exam 11/2024;  no laser surgery or DR    Lab Results   Component Value Date    TSH 1.152 01/10/2024       Lab Results   Component Value Date    TTGIGA 4 03/04/2019       ROS as above    Objective:     Vitals:  BP: 146/92  P 97  WT 87kg    Physical Exam  Constitutional:       Appearance: Normal appearance.   HENT:      Head: Normocephalic.   Eyes:      Conjunctiva/sclera: Conjunctivae normal.   Pulmonary:      Effort: Pulmonary effort is normal.   Musculoskeletal:      Cervical back: Neck supple.      Comments: Some skin irritation in the b/l upper extremity where insulin pump was previously placed, no sign of lipohypertrophy   Neurological:      Mental Status: He is alert.   Psychiatric:         Behavior: Behavior normal.          Protective Sensation (w/ 10 gram monofilament):  Right: Intact  Left: Intact    Visual  "Inspection:  Normal -  Bilateral    Pedal Pulses:   Right: Present  Left: Present    Posterior Tibialis Pulses:   Right:Present  Left: Present     Assessment and Plan     Insulin pump status  Patient having trouble with pump and CGM staying on skin, reports trying skin tac, flonase, and overpatch. Concerns with over patch possible causing skin irritation.    Provided patient with resources to help keep devices in place, recommended he consider Mastisol liquid adhesive.    Plan for new insulin settings with two profiles  "Home"  Midnight 1.000 u/hr 1u:35 mg/dL 1u:7.0 g 110 mg/dL  6:00 AM 1.300 u/hr 1u:30 mg/dL 1u:5.0 g 110 mg/dL  9:00 AM 1.100 u/hr 1u:30 mg/dL 1u:5.0 g 110 mg/dL  11:00 AM 1.100 u/hr 1u:20 mg/dL 1u:4.0 g 110 mg/dL  5:00 PM   1.100 u/hr 1u:20 mg/dL 1u:4.0 g 110 mg/dL     "Work"  Midnight 1.000 u/hr 1u:35 mg/dL 1u:7.0 g 110 mg/dL  6:00 AM 1.100 u/hr 1u:30 mg/dL 1u:6.0 g 110 mg/dL  9:00 AM 0.900 u/hr 1u:30 mg/dL 1u:6.0 g 110 mg/dL  11:00 AM 0.900 u/hr 1u:25 mg/dL 1u:5.0 g 110 mg/dL  5:00 PM   1.100 u/hr 1u:25 mg/dL 1u:4.0 g 110 mg/dL    Type 1 diabetes mellitus without complication  Using Tandem insulin pump with Dexcom G7  A1C 7.4%  Patient is in range 63% of the time with 1.3% low and 0.7% very low with 98% active CGM time. Patient is using about 88 average daily units of insulin with a 33% basal and 67% bolus composition. Global hyperglycemia more evident with prandial meals. Bolus timing appears to not be consistently before meals, multiple corrections during the day to balance sustained bg excursions. Some hypoglycemia on review, appears to occur with possible insulin stacking with correction from the patient, auto correct, and bolus at the same time leading to steep declines in blood glucose.    Blood glucose appears to be less controlled on patient's off days from work due to lack of physical activity. On work days patient appears to be better controlled although at times may dip into hypoglycemia " "range at times on activity mode.   Plan to strengthen carb ratio on days which patient is not working starting at 6 am, will also strengthen correction factor.  Plan to decrease basal insulin starting at 6 am and strengthen carb ratio at 5 pm on working days.  Adjustments made to pump today in clinic with new settings and changes reflected in bold below.    Setting 1: "Home"  Midnight 1.000 u/hr 1u:35 mg/dL 1u:7.0 g 110 mg/dL  6:00 AM 1.300 u/hr 1u:30 mg/dL 1u:5.0 g 110 mg/dL  9:00 AM 1.100 u/hr 1u:30 mg/dL 1u:5.0 g 110 mg/dL  11:00 AM 1.100 u/hr 1u:20 mg/dL 1u:4.0 g 110 mg/dL  5:00 PM   1.100 u/hr 1u:20 mg/dL 1u:4.0 g 110 mg/dL     Setting 2: "Work"  Midnight 1.000 u/hr 1u:35 mg/dL 1u:7.0 g 110 mg/dL  6:00 AM 1.100 u/hr 1u:30 mg/dL 1u:6.0 g 110 mg/dL  9:00 AM 0.900 u/hr 1u:30 mg/dL 1u:6.0 g 110 mg/dL  11:00 AM 0.900 u/hr 1u:25 mg/dL 1u:5.0 g 110 mg/dL  5:00 PM   1.100 u/hr 1u:25 mg/dL 1u:4.0 g 110 mg/dL    Healthcare maintenance:  -urine microalbumin due, order placed, not on ACE or ARB  -lipid panel done on 3/2025, not on statin  -feet exam performed today 3/2025, unremarkable  -eye exam done on 11/2024        RTC 3 months     Pawan Valdez MD              "

## 2025-03-27 NOTE — ASSESSMENT & PLAN NOTE
"Using Tandem insulin pump with Dexcom G7  A1C 7.4%  Patient is in range 63% of the time with 1.3% low and 0.7% very low with 98% active CGM time. Patient is using about 88 average daily units of insulin with a 33% basal and 67% bolus composition. Global hyperglycemia more evident with prandial meals. Bolus timing appears to not be consistently before meals, multiple corrections during the day to balance sustained bg excursions. Some hypoglycemia on review, appears to occur with possible insulin stacking with correction from the patient, auto correct, and bolus at the same time leading to steep declines in blood glucose.    Blood glucose appears to be less controlled on patient's off days from work due to lack of physical activity. On work days patient appears to be better controlled although at times may dip into hypoglycemia range at times on activity mode.   Plan to strengthen carb ratio on days which patient is not working starting at 6 am, will also strengthen correction factor.  Plan to decrease basal insulin starting at 6 am and strengthen carb ratio at 5 pm on working days.  Adjustments made to pump today in clinic with new settings and changes reflected in bold below.    Setting 1: "Home"  Midnight 1.000 u/hr 1u:35 mg/dL 1u:7.0 g 110 mg/dL  6:00 AM 1.300 u/hr 1u:30 mg/dL 1u:5.0 g 110 mg/dL  9:00 AM 1.100 u/hr 1u:30 mg/dL 1u:5.0 g 110 mg/dL  11:00 AM 1.100 u/hr 1u:20 mg/dL 1u:4.0 g 110 mg/dL  5:00 PM   1.100 u/hr 1u:20 mg/dL 1u:4.0 g 110 mg/dL     Setting 2: "Work"  Midnight 1.000 u/hr 1u:35 mg/dL 1u:7.0 g 110 mg/dL  6:00 AM 1.100 u/hr 1u:30 mg/dL 1u:6.0 g 110 mg/dL  9:00 AM 0.900 u/hr 1u:30 mg/dL 1u:6.0 g 110 mg/dL  11:00 AM 0.900 u/hr 1u:25 mg/dL 1u:5.0 g 110 mg/dL  5:00 PM   1.100 u/hr 1u:25 mg/dL 1u:4.0 g 110 mg/dL    Healthcare maintenance:  -urine microalbumin due, order placed, not on ACE or ARB  -lipid panel done on 3/2025, not on statin  -feet exam performed today 3/2025, unremarkable  -eye exam done " on 11/2024

## 2025-03-29 DIAGNOSIS — E10.65 TYPE 1 DIABETES MELLITUS WITH HYPERGLYCEMIA: ICD-10-CM

## 2025-03-29 DIAGNOSIS — Z96.41 INSULIN PUMP STATUS: ICD-10-CM

## 2025-03-29 NOTE — PROGRESS NOTES
I have reviewed and concur with Dr. Valdez's history, physical, assessment, and plan.  I have personally interviewed and examined the patient.    Pump and sensor report reviewed. Varying needs on work vs nonworking days so will try two different profiles. Ok to also use exercise mode on work days as needed  Discussed strategies to help with recent skin irritation from both pump and sensor    Visit today included increased complexity associated with the care of the problems addressed and managing the longitudinal care of the patient due to the serious and/or complex managed problems      Penelope Sol MD

## 2025-03-31 RX ORDER — BLOOD-GLUCOSE SENSOR
1 EACH MISCELLANEOUS
Qty: 9 EACH | Refills: 3 | Status: SHIPPED | OUTPATIENT
Start: 2025-03-31

## 2025-03-31 RX ORDER — INSULIN LISPRO 100 [IU]/ML
INJECTION, SOLUTION INTRAVENOUS; SUBCUTANEOUS
Qty: 90 ML | Refills: 3 | Status: SHIPPED | OUTPATIENT
Start: 2025-03-31

## 2025-07-10 ENCOUNTER — LAB VISIT (OUTPATIENT)
Dept: LAB | Facility: HOSPITAL | Age: 25
End: 2025-07-10
Attending: STUDENT IN AN ORGANIZED HEALTH CARE EDUCATION/TRAINING PROGRAM
Payer: COMMERCIAL

## 2025-07-10 DIAGNOSIS — E10.9 TYPE 1 DIABETES MELLITUS WITHOUT COMPLICATION: ICD-10-CM

## 2025-07-10 PROCEDURE — 83036 HEMOGLOBIN GLYCOSYLATED A1C: CPT

## 2025-07-10 PROCEDURE — 36415 COLL VENOUS BLD VENIPUNCTURE: CPT

## 2025-07-11 LAB
EAG (OHS): 140 MG/DL (ref 68–131)
HBA1C MFR BLD: 6.5 % (ref 4–5.6)

## 2025-07-17 ENCOUNTER — OFFICE VISIT (OUTPATIENT)
Dept: ENDOCRINOLOGY | Facility: CLINIC | Age: 25
End: 2025-07-17
Payer: COMMERCIAL

## 2025-07-17 VITALS
SYSTOLIC BLOOD PRESSURE: 130 MMHG | DIASTOLIC BLOOD PRESSURE: 86 MMHG | BODY MASS INDEX: 32.64 KG/M2 | WEIGHT: 195.88 LBS | HEIGHT: 65 IN

## 2025-07-17 DIAGNOSIS — R23.8 SKIN IRRITATION: Primary | ICD-10-CM

## 2025-07-17 DIAGNOSIS — R68.89 HEAT INTOLERANCE: ICD-10-CM

## 2025-07-17 DIAGNOSIS — E10.65 TYPE 1 DIABETES MELLITUS WITH HYPERGLYCEMIA: ICD-10-CM

## 2025-07-17 PROCEDURE — 99999 PR PBB SHADOW E&M-EST. PATIENT-LVL III: CPT | Mod: PBBFAC,,,

## 2025-07-17 RX ORDER — GLUCAGON 3 MG/1
1 POWDER NASAL
Qty: 2 EACH | Refills: 2 | Status: SHIPPED | OUTPATIENT
Start: 2025-07-17

## 2025-07-17 NOTE — PATIENT INSTRUCTIONS
Pump backup plan    If your insulin pump is not working or if you will need to be off of it for an extended period of time, you will need to change to insulin injections with both a long-acting (basal) and short-acting (mealtime/correction) insulin.    You should always have a long-acting insulin on hand in case of pump failure. This will come in an insulin pen or vial and you will need pen needles or syringes to go with it. For short-acting, you can either have some pens or you can use the vials of insulin that you use to fill your pump. If you plan to use vials, you will need insulin syringes to use with these.     Your pump backup plan is the following:   Take long acting insulin: Tresiba 32 units daily  Take short acting insulin:  novolog/humalog with carb ratio of 1 unit per 6 grams and correction 1 unit per 30 above 120    It is best to restart pump about 2 hours before you are due for next basal dose but automated basal will help adjust if started sooner and you still have some long-acting insulin active    If your pump and CGM do not communicate with each other, it is best to restart the pump about 2 hours before the next dose of long-acting insulin would be due    For any technical insulin pump issues, please contact the insulin pump company.

## 2025-07-17 NOTE — PROGRESS NOTES
Cindy Garcia is a 24 y.o. male  presenting for follow-up of  T1DM    History of Present Illness  T1DM  Diagnosed at age 5  Known complications: none    History of Present Illness      DIABETES MANAGEMENT:  He reports improved diabetes management with A1C decreasing from 7.4 to 6.5 in recent months. His average blood sugar is in the 160s with GMI estimated at 7.3 over the last two weeks. He was walking 2 miles almost daily in May and June but discontinued due to illness. 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 He acknowledges potential over-treatment of dropping blood sugars during physical activity.    Alternating between work and home pump profiles and thinks this helps    DIABETES EQUIPMENT AND SUPPLIES:  He rotates pump sites between stomach, lateral side, back, and buttocks, having discontinued arm sites due to skin irritation. He manages potential skin irritation using Flonase followed by 3M barrier cream after allowing Flonase to dry    THYROID:  He reports feeling unusually hot at work, more pronounced than typical workplace temperature. He has a family history of thyroid issues, with his father having undergone thyroid removal. His last thyroid check in January 2024 was normal. He denies tremors, palpitations, or unexplained weight changes.        Current Diabetes Regimen:  Tandem TSlim with Control IQ, Humalog      Backup basal: Tresiba  Glucagon: baqsimi      Last Hgb A1C:  Lab Results   Component Value Date    HGBA1C 6.5 (H) 07/10/2025       Glucose Monitoring:  Meter/CGM: Dexcom G7. On pump report        Hypoglycemic Episodes: as above, often midday at work    DKA: last when he was in middle school, none recent    Screening / DM Complications:    Nephropathy:  ACEi/ARB: Not taking  Lab Results   Component Value Date    MICALBCREAT Unable to calculate 01/10/2024       Last Lipid  Panel:  Statin: Not taking  Lab Results   Component Value Date    LDLCALC 109.6 03/26/2025       Last foot exam 3/27/2025  Last eye exam 11/2024;  no laser surgery or DR    Lab Results   Component Value Date    TSH 1.152 01/10/2024       Lab Results   Component Value Date    TTGIGA 4 03/04/2019       ROS as above    Objective:     Vitals:    07/17/25 1336   BP: 130/86     Physical Exam  Constitutional:       Appearance: He is well-developed.   HENT:      Head: Normocephalic.   Eyes:      Conjunctiva/sclera: Conjunctivae normal.   Pulmonary:      Effort: Pulmonary effort is normal.   Musculoskeletal:         General: Normal range of motion.   Skin:     General: Skin is warm.      Findings: No rash.   Neurological:      Mental Status: He is alert and oriented to person, place, and time.           1. Skin irritation    2. Type 1 diabetes mellitus with hyperglycemia    3. Heat intolerance        Assessment & Plan      TYPE 1 DIABETES MANAGEMENT:  - Improvement in A1C from 7.4 to 6.5, likely due to increased physical activity.  - Reviewed pump data showing average glucose in 160s and GMI estimated at 7.3.  - Identified pattern of nighttime hyperglycemia and post-breakfast hyperglycemia.  - Adjusted insulin pump settings:  - Increased overnight basal rate from 12 AM to 1.1 units/hour for both home and work profiles to address morning hyperglycemia.  - Changed carb ratio at 6 AM to 1:4.5 for both home and work profiles, at 9 AM to 1:4.5 for home profile only to strengthen breakfast carb ratio.  - Decreased basal insulin rate for work profile from 9 AM and 11 AM to 0.8 units/hour to prevent hypoglycemia during work hours.  - Recommend use of temp basal feature for better glycemic control during work and exercise.    - Explained use of temp basal feature on insulin pump, including how to set percentage and duration.  - Patient to try using temp basal feature on insulin pump, starting at 70% reduction around 10:00 AM while at  work.  - Patient to double-check diabetes supplies and ensure pump backup plan is accessible.  - Refilled Baqsimi (nasal glucagon) prescription.    SKIN IRRITATION:  - cont current management  - can try diphenhydramine cream if finds he needs it    THYROID MONITORING:  - Considered thyroid function given heat intolerance, but deferred testing due to recent normal results and lack of other symptoms.  - Reviewed symptoms of hyperthyroidism to monitor.  - TSH to be included with annual labs, offered now but he deferred. Can do sooner if new symptoms    FOLLOW-UP:  - Patient to schedule eye exam before November.             Penelope Sol MD    Visit today included increased complexity associated with the care of the problems addressed and managing the longitudinal care of the patient due to the serious and/or complex managed problems      This note was generated with the assistance of ambient listening technology. Verbal consent was obtained by the patient and accompanying visitor(s) for the recording of patient appointment to facilitate this note. I attest to having reviewed and edited the generated note for accuracy, though some syntax or spelling errors may persist. Please contact the author of this note for any clarification.